# Patient Record
Sex: FEMALE | Race: WHITE | NOT HISPANIC OR LATINO | Employment: OTHER | URBAN - METROPOLITAN AREA
[De-identification: names, ages, dates, MRNs, and addresses within clinical notes are randomized per-mention and may not be internally consistent; named-entity substitution may affect disease eponyms.]

---

## 2017-01-09 ENCOUNTER — OFFICE VISIT - HEALTHEAST (OUTPATIENT)
Dept: INTERNAL MEDICINE | Facility: CLINIC | Age: 77
End: 2017-01-09

## 2017-01-09 DIAGNOSIS — I10 ESSENTIAL HYPERTENSION WITH GOAL BLOOD PRESSURE LESS THAN 140/90: ICD-10-CM

## 2017-01-09 ASSESSMENT — MIFFLIN-ST. JEOR: SCORE: 1288.23

## 2017-03-06 ENCOUNTER — OFFICE VISIT - HEALTHEAST (OUTPATIENT)
Dept: INTERNAL MEDICINE | Facility: CLINIC | Age: 77
End: 2017-03-06

## 2017-03-06 DIAGNOSIS — I10 ESSENTIAL HYPERTENSION WITH GOAL BLOOD PRESSURE LESS THAN 140/90: ICD-10-CM

## 2017-03-06 ASSESSMENT — MIFFLIN-ST. JEOR: SCORE: 1297.3

## 2017-04-03 ENCOUNTER — COMMUNICATION - HEALTHEAST (OUTPATIENT)
Dept: INTERNAL MEDICINE | Facility: CLINIC | Age: 77
End: 2017-04-03

## 2017-04-13 ENCOUNTER — OFFICE VISIT - HEALTHEAST (OUTPATIENT)
Dept: INTERNAL MEDICINE | Facility: CLINIC | Age: 77
End: 2017-04-13

## 2017-04-13 DIAGNOSIS — I10 ESSENTIAL HYPERTENSION WITH GOAL BLOOD PRESSURE LESS THAN 140/90: ICD-10-CM

## 2017-04-13 ASSESSMENT — MIFFLIN-ST. JEOR: SCORE: 1292.76

## 2017-04-17 ENCOUNTER — RECORDS - HEALTHEAST (OUTPATIENT)
Dept: ADMINISTRATIVE | Facility: OTHER | Age: 77
End: 2017-04-17

## 2017-04-23 ENCOUNTER — COMMUNICATION - HEALTHEAST (OUTPATIENT)
Dept: INTERNAL MEDICINE | Facility: CLINIC | Age: 77
End: 2017-04-23

## 2017-04-23 DIAGNOSIS — I10 HTN (HYPERTENSION): ICD-10-CM

## 2017-05-13 ENCOUNTER — COMMUNICATION - HEALTHEAST (OUTPATIENT)
Dept: INTERNAL MEDICINE | Facility: CLINIC | Age: 77
End: 2017-05-13

## 2017-06-06 ENCOUNTER — HOSPITAL ENCOUNTER (OUTPATIENT)
Dept: MAMMOGRAPHY | Facility: HOSPITAL | Age: 77
Discharge: HOME | End: 2017-06-06
Attending: INTERNAL MEDICINE

## 2017-06-06 DIAGNOSIS — Z12.31 VISIT FOR SCREENING MAMMOGRAM: ICD-10-CM

## 2017-06-29 ENCOUNTER — OFFICE VISIT - HEALTHEAST (OUTPATIENT)
Dept: INTERNAL MEDICINE | Facility: CLINIC | Age: 77
End: 2017-06-29

## 2017-06-29 DIAGNOSIS — I10 ESSENTIAL HYPERTENSION WITH GOAL BLOOD PRESSURE LESS THAN 140/90: ICD-10-CM

## 2017-06-29 ASSESSMENT — MIFFLIN-ST. JEOR: SCORE: 1288.23

## 2017-07-21 ENCOUNTER — COMMUNICATION - HEALTHEAST (OUTPATIENT)
Dept: INTERNAL MEDICINE | Facility: CLINIC | Age: 77
End: 2017-07-21

## 2017-07-21 DIAGNOSIS — I10 HTN (HYPERTENSION): ICD-10-CM

## 2017-08-10 ENCOUNTER — OFFICE VISIT - HEALTHEAST (OUTPATIENT)
Dept: INTERNAL MEDICINE | Facility: CLINIC | Age: 77
End: 2017-08-10

## 2017-08-10 DIAGNOSIS — I10 ESSENTIAL HYPERTENSION WITH GOAL BLOOD PRESSURE LESS THAN 140/90: ICD-10-CM

## 2017-08-10 ASSESSMENT — MIFFLIN-ST. JEOR: SCORE: 1292.76

## 2017-08-25 ENCOUNTER — COMMUNICATION - HEALTHEAST (OUTPATIENT)
Dept: INTERNAL MEDICINE | Facility: CLINIC | Age: 77
End: 2017-08-25

## 2017-10-19 ENCOUNTER — COMMUNICATION - HEALTHEAST (OUTPATIENT)
Dept: INTERNAL MEDICINE | Facility: CLINIC | Age: 77
End: 2017-10-19

## 2017-10-19 DIAGNOSIS — I10 HTN (HYPERTENSION): ICD-10-CM

## 2017-10-24 ENCOUNTER — OFFICE VISIT - HEALTHEAST (OUTPATIENT)
Dept: INTERNAL MEDICINE | Facility: CLINIC | Age: 77
End: 2017-10-24

## 2017-10-24 ENCOUNTER — COMMUNICATION - HEALTHEAST (OUTPATIENT)
Dept: INTERNAL MEDICINE | Facility: CLINIC | Age: 77
End: 2017-10-24

## 2017-10-24 DIAGNOSIS — R73.9 HYPERGLYCEMIA: ICD-10-CM

## 2017-10-24 DIAGNOSIS — I10 ESSENTIAL HYPERTENSION: ICD-10-CM

## 2017-10-24 LAB
CHOLEST SERPL-MCNC: 188 MG/DL
FASTING STATUS PATIENT QL REPORTED: YES
HBA1C MFR BLD: 6 % (ref 3.5–6)
HDLC SERPL-MCNC: 54 MG/DL
LDLC SERPL CALC-MCNC: 94 MG/DL
TRIGL SERPL-MCNC: 198 MG/DL

## 2017-10-24 ASSESSMENT — MIFFLIN-ST. JEOR: SCORE: 1306.37

## 2017-11-18 ENCOUNTER — COMMUNICATION - HEALTHEAST (OUTPATIENT)
Dept: INTERNAL MEDICINE | Facility: CLINIC | Age: 77
End: 2017-11-18

## 2018-01-04 ENCOUNTER — OFFICE VISIT - HEALTHEAST (OUTPATIENT)
Dept: INTERNAL MEDICINE | Facility: CLINIC | Age: 78
End: 2018-01-04

## 2018-01-04 DIAGNOSIS — I10 ESSENTIAL HYPERTENSION: ICD-10-CM

## 2018-01-04 ASSESSMENT — MIFFLIN-ST. JEOR: SCORE: 1301.84

## 2018-01-21 ENCOUNTER — COMMUNICATION - HEALTHEAST (OUTPATIENT)
Dept: INTERNAL MEDICINE | Facility: CLINIC | Age: 78
End: 2018-01-21

## 2018-01-21 DIAGNOSIS — I10 HTN (HYPERTENSION): ICD-10-CM

## 2018-02-10 ENCOUNTER — COMMUNICATION - HEALTHEAST (OUTPATIENT)
Dept: INTERNAL MEDICINE | Facility: CLINIC | Age: 78
End: 2018-02-10

## 2018-03-29 ENCOUNTER — OFFICE VISIT - HEALTHEAST (OUTPATIENT)
Dept: INTERNAL MEDICINE | Facility: CLINIC | Age: 78
End: 2018-03-29

## 2018-03-29 DIAGNOSIS — I10 ESSENTIAL HYPERTENSION: ICD-10-CM

## 2018-03-29 ASSESSMENT — MIFFLIN-ST. JEOR: SCORE: 1297.3

## 2018-04-20 ENCOUNTER — COMMUNICATION - HEALTHEAST (OUTPATIENT)
Dept: INTERNAL MEDICINE | Facility: CLINIC | Age: 78
End: 2018-04-20

## 2018-04-20 DIAGNOSIS — I10 HTN (HYPERTENSION): ICD-10-CM

## 2018-04-29 ENCOUNTER — RECORDS - HEALTHEAST (OUTPATIENT)
Dept: ADMINISTRATIVE | Facility: OTHER | Age: 78
End: 2018-04-29

## 2018-05-25 ENCOUNTER — COMMUNICATION - HEALTHEAST (OUTPATIENT)
Dept: INTERNAL MEDICINE | Facility: CLINIC | Age: 78
End: 2018-05-25

## 2018-06-04 ENCOUNTER — COMMUNICATION - HEALTHEAST (OUTPATIENT)
Dept: INTERNAL MEDICINE | Facility: CLINIC | Age: 78
End: 2018-06-04

## 2018-06-04 ENCOUNTER — OFFICE VISIT - HEALTHEAST (OUTPATIENT)
Dept: INTERNAL MEDICINE | Facility: CLINIC | Age: 78
End: 2018-06-04

## 2018-06-04 DIAGNOSIS — E11.9 DIABETES MELLITUS, TYPE 2 (H): ICD-10-CM

## 2018-06-04 LAB
CHOLEST SERPL-MCNC: 196 MG/DL
FASTING STATUS PATIENT QL REPORTED: ABNORMAL
FASTING STATUS PATIENT QL REPORTED: ABNORMAL
GLUCOSE BLD-MCNC: 128 MG/DL (ref 70–125)
HBA1C MFR BLD: 5.9 % (ref 3.5–6)
HDLC SERPL-MCNC: 58 MG/DL
LDLC SERPL CALC-MCNC: 104 MG/DL
TRIGL SERPL-MCNC: 170 MG/DL

## 2018-06-04 ASSESSMENT — MIFFLIN-ST. JEOR: SCORE: 1283.69

## 2018-06-21 ENCOUNTER — HOSPITAL ENCOUNTER (OUTPATIENT)
Dept: MAMMOGRAPHY | Facility: CLINIC | Age: 78
Discharge: HOME | End: 2018-06-21
Attending: INTERNAL MEDICINE

## 2018-06-21 DIAGNOSIS — Z12.31 VISIT FOR SCREENING MAMMOGRAM: ICD-10-CM

## 2018-07-21 ENCOUNTER — COMMUNICATION - HEALTHEAST (OUTPATIENT)
Dept: INTERNAL MEDICINE | Facility: CLINIC | Age: 78
End: 2018-07-21

## 2018-07-21 DIAGNOSIS — I10 HTN (HYPERTENSION): ICD-10-CM

## 2018-08-12 ENCOUNTER — COMMUNICATION - HEALTHEAST (OUTPATIENT)
Dept: INTERNAL MEDICINE | Facility: CLINIC | Age: 78
End: 2018-08-12

## 2018-09-10 ENCOUNTER — OFFICE VISIT - HEALTHEAST (OUTPATIENT)
Dept: INTERNAL MEDICINE | Facility: CLINIC | Age: 78
End: 2018-09-10

## 2018-09-10 ENCOUNTER — COMMUNICATION - HEALTHEAST (OUTPATIENT)
Dept: INTERNAL MEDICINE | Facility: CLINIC | Age: 78
End: 2018-09-10

## 2018-09-10 DIAGNOSIS — I10 ESSENTIAL HYPERTENSION: ICD-10-CM

## 2018-09-10 ASSESSMENT — MIFFLIN-ST. JEOR: SCORE: 1288.23

## 2018-11-08 ENCOUNTER — COMMUNICATION - HEALTHEAST (OUTPATIENT)
Dept: INTERNAL MEDICINE | Facility: CLINIC | Age: 78
End: 2018-11-08

## 2018-11-13 ENCOUNTER — OFFICE VISIT - HEALTHEAST (OUTPATIENT)
Dept: INTERNAL MEDICINE | Facility: CLINIC | Age: 78
End: 2018-11-13

## 2018-11-13 DIAGNOSIS — I10 ESSENTIAL HYPERTENSION: ICD-10-CM

## 2018-11-13 ASSESSMENT — MIFFLIN-ST. JEOR: SCORE: 1279.16

## 2018-11-21 ENCOUNTER — COMMUNICATION - HEALTHEAST (OUTPATIENT)
Dept: INTERNAL MEDICINE | Facility: CLINIC | Age: 78
End: 2018-11-21

## 2019-02-07 ENCOUNTER — COMMUNICATION - HEALTHEAST (OUTPATIENT)
Dept: INTERNAL MEDICINE | Facility: CLINIC | Age: 79
End: 2019-02-07

## 2019-03-14 ENCOUNTER — OFFICE VISIT - HEALTHEAST (OUTPATIENT)
Dept: INTERNAL MEDICINE | Facility: CLINIC | Age: 79
End: 2019-03-14

## 2019-03-14 DIAGNOSIS — I10 ESSENTIAL HYPERTENSION: ICD-10-CM

## 2019-03-14 ASSESSMENT — MIFFLIN-ST. JEOR: SCORE: 1292.76

## 2019-03-18 ENCOUNTER — RECORDS - HEALTHEAST (OUTPATIENT)
Dept: ADMINISTRATIVE | Facility: OTHER | Age: 79
End: 2019-03-18

## 2019-03-26 ENCOUNTER — RECORDS - HEALTHEAST (OUTPATIENT)
Dept: HEALTH INFORMATION MANAGEMENT | Facility: CLINIC | Age: 79
End: 2019-03-26

## 2019-05-10 ENCOUNTER — COMMUNICATION - HEALTHEAST (OUTPATIENT)
Dept: INTERNAL MEDICINE | Facility: CLINIC | Age: 79
End: 2019-05-10

## 2019-05-10 DIAGNOSIS — Z00.00 ROUTINE GENERAL MEDICAL EXAMINATION AT A HEALTH CARE FACILITY: ICD-10-CM

## 2019-07-08 ENCOUNTER — COMMUNICATION - HEALTHEAST (OUTPATIENT)
Dept: INTERNAL MEDICINE | Facility: CLINIC | Age: 79
End: 2019-07-08

## 2019-07-08 ENCOUNTER — OFFICE VISIT - HEALTHEAST (OUTPATIENT)
Dept: INTERNAL MEDICINE | Facility: CLINIC | Age: 79
End: 2019-07-08

## 2019-07-08 DIAGNOSIS — E11.8 TYPE 2 DIABETES MELLITUS WITH COMPLICATION, WITHOUT LONG-TERM CURRENT USE OF INSULIN (H): ICD-10-CM

## 2019-07-08 DIAGNOSIS — E66.01 MORBID OBESITY (H): ICD-10-CM

## 2019-07-08 LAB
CHOLEST SERPL-MCNC: 214 MG/DL
FASTING STATUS PATIENT QL REPORTED: YES
FASTING STATUS PATIENT QL REPORTED: YES
GLUCOSE BLD-MCNC: 123 MG/DL (ref 70–125)
HBA1C MFR BLD: 6.2 % (ref 3.5–6)
HDLC SERPL-MCNC: 52 MG/DL
HGB BLD-MCNC: 15.3 G/DL (ref 12–16)
LDLC SERPL CALC-MCNC: 104 MG/DL
POTASSIUM BLD-SCNC: 4.9 MMOL/L (ref 3.5–5)
TRIGL SERPL-MCNC: 290 MG/DL

## 2019-07-08 ASSESSMENT — MIFFLIN-ST. JEOR: SCORE: 1279.16

## 2019-07-20 ENCOUNTER — COMMUNICATION - HEALTHEAST (OUTPATIENT)
Dept: INTERNAL MEDICINE | Facility: CLINIC | Age: 79
End: 2019-07-20

## 2019-07-20 DIAGNOSIS — I10 HTN (HYPERTENSION): ICD-10-CM

## 2019-08-04 ENCOUNTER — COMMUNICATION - HEALTHEAST (OUTPATIENT)
Dept: INTERNAL MEDICINE | Facility: CLINIC | Age: 79
End: 2019-08-04

## 2019-08-04 DIAGNOSIS — Z00.00 ROUTINE GENERAL MEDICAL EXAMINATION AT A HEALTH CARE FACILITY: ICD-10-CM

## 2019-08-18 ENCOUNTER — COMMUNICATION - HEALTHEAST (OUTPATIENT)
Dept: INTERNAL MEDICINE | Facility: CLINIC | Age: 79
End: 2019-08-18

## 2019-08-18 DIAGNOSIS — Z00.00 ROUTINE GENERAL MEDICAL EXAMINATION AT A HEALTH CARE FACILITY: ICD-10-CM

## 2019-09-14 ENCOUNTER — COMMUNICATION - HEALTHEAST (OUTPATIENT)
Dept: INTERNAL MEDICINE | Facility: CLINIC | Age: 79
End: 2019-09-14

## 2019-09-14 DIAGNOSIS — Z00.00 ROUTINE GENERAL MEDICAL EXAMINATION AT A HEALTH CARE FACILITY: ICD-10-CM

## 2019-10-04 ENCOUNTER — AMBULATORY - HEALTHEAST (OUTPATIENT)
Dept: INTERNAL MEDICINE | Facility: CLINIC | Age: 79
End: 2019-10-04

## 2019-10-21 ENCOUNTER — OFFICE VISIT - HEALTHEAST (OUTPATIENT)
Dept: INTERNAL MEDICINE | Facility: CLINIC | Age: 79
End: 2019-10-21

## 2019-10-21 DIAGNOSIS — I10 ESSENTIAL HYPERTENSION: ICD-10-CM

## 2019-10-21 ASSESSMENT — MIFFLIN-ST. JEOR: SCORE: 1283.69

## 2019-11-03 ENCOUNTER — COMMUNICATION - HEALTHEAST (OUTPATIENT)
Dept: INTERNAL MEDICINE | Facility: CLINIC | Age: 79
End: 2019-11-03

## 2019-11-03 DIAGNOSIS — I10 ESSENTIAL HYPERTENSION: ICD-10-CM

## 2020-01-21 ENCOUNTER — OFFICE VISIT - HEALTHEAST (OUTPATIENT)
Dept: INTERNAL MEDICINE | Facility: CLINIC | Age: 80
End: 2020-01-21

## 2020-01-21 DIAGNOSIS — E11.69 TYPE 2 DIABETES MELLITUS WITH OTHER SPECIFIED COMPLICATION, WITHOUT LONG-TERM CURRENT USE OF INSULIN (H): ICD-10-CM

## 2020-01-21 LAB
CHOLEST SERPL-MCNC: 213 MG/DL
FASTING STATUS PATIENT QL REPORTED: YES
FASTING STATUS PATIENT QL REPORTED: YES
GLUCOSE BLD-MCNC: 117 MG/DL (ref 70–125)
HBA1C MFR BLD: 5.9 % (ref 3.5–6)
HDLC SERPL-MCNC: 61 MG/DL
LDLC SERPL CALC-MCNC: 115 MG/DL
POTASSIUM BLD-SCNC: 4.3 MMOL/L (ref 3.5–5)
TRIGL SERPL-MCNC: 185 MG/DL

## 2020-01-21 ASSESSMENT — MIFFLIN-ST. JEOR: SCORE: 1265.55

## 2020-01-22 ENCOUNTER — COMMUNICATION - HEALTHEAST (OUTPATIENT)
Dept: INTERNAL MEDICINE | Facility: CLINIC | Age: 80
End: 2020-01-22

## 2020-02-01 ENCOUNTER — COMMUNICATION - HEALTHEAST (OUTPATIENT)
Dept: INTERNAL MEDICINE | Facility: CLINIC | Age: 80
End: 2020-02-01

## 2020-02-01 DIAGNOSIS — I10 ESSENTIAL HYPERTENSION: ICD-10-CM

## 2020-05-11 ENCOUNTER — COMMUNICATION - HEALTHEAST (OUTPATIENT)
Dept: INTERNAL MEDICINE | Facility: CLINIC | Age: 80
End: 2020-05-11

## 2020-05-11 DIAGNOSIS — I10 ESSENTIAL HYPERTENSION: ICD-10-CM

## 2020-07-14 ENCOUNTER — OFFICE VISIT - HEALTHEAST (OUTPATIENT)
Dept: INTERNAL MEDICINE | Facility: CLINIC | Age: 80
End: 2020-07-14

## 2020-07-14 DIAGNOSIS — T78.2XXA ANAPHYLACTIC REACTION: ICD-10-CM

## 2020-07-14 ASSESSMENT — MIFFLIN-ST. JEOR: SCORE: 1297.3

## 2020-07-18 ENCOUNTER — COMMUNICATION - HEALTHEAST (OUTPATIENT)
Dept: INTERNAL MEDICINE | Facility: CLINIC | Age: 80
End: 2020-07-18

## 2020-07-18 DIAGNOSIS — I10 HTN (HYPERTENSION): ICD-10-CM

## 2020-07-19 RX ORDER — AMLODIPINE BESYLATE 10 MG/1
10 TABLET ORAL DAILY
Qty: 90 TABLET | Refills: 3 | Status: SHIPPED | OUTPATIENT
Start: 2020-07-19 | End: 2022-01-04

## 2020-08-02 ENCOUNTER — COMMUNICATION - HEALTHEAST (OUTPATIENT)
Dept: INTERNAL MEDICINE | Facility: CLINIC | Age: 80
End: 2020-08-02

## 2020-08-02 DIAGNOSIS — Z00.00 ROUTINE GENERAL MEDICAL EXAMINATION AT A HEALTH CARE FACILITY: ICD-10-CM

## 2020-08-03 RX ORDER — METOPROLOL SUCCINATE 25 MG/1
TABLET, EXTENDED RELEASE ORAL
Qty: 180 TABLET | Refills: 3 | Status: SHIPPED | OUTPATIENT
Start: 2020-08-03 | End: 2021-08-08

## 2020-08-09 ENCOUNTER — COMMUNICATION - HEALTHEAST (OUTPATIENT)
Dept: INTERNAL MEDICINE | Facility: CLINIC | Age: 80
End: 2020-08-09

## 2020-08-09 DIAGNOSIS — I10 ESSENTIAL HYPERTENSION: ICD-10-CM

## 2020-10-12 ENCOUNTER — COMMUNICATION - HEALTHEAST (OUTPATIENT)
Dept: INTERNAL MEDICINE | Facility: CLINIC | Age: 80
End: 2020-10-12

## 2020-10-12 DIAGNOSIS — I10 ESSENTIAL HYPERTENSION: ICD-10-CM

## 2020-10-12 RX ORDER — LOSARTAN POTASSIUM 50 MG/1
50 TABLET ORAL DAILY
Qty: 90 TABLET | Refills: 3 | Status: SHIPPED | OUTPATIENT
Start: 2020-10-12 | End: 2021-10-03

## 2020-11-17 ENCOUNTER — OFFICE VISIT - HEALTHEAST (OUTPATIENT)
Dept: INTERNAL MEDICINE | Facility: CLINIC | Age: 80
End: 2020-11-17

## 2020-11-17 DIAGNOSIS — E11.69 TYPE 2 DIABETES MELLITUS WITH OTHER SPECIFIED COMPLICATION, WITHOUT LONG-TERM CURRENT USE OF INSULIN (H): ICD-10-CM

## 2020-11-17 DIAGNOSIS — I10 ESSENTIAL HYPERTENSION: ICD-10-CM

## 2020-11-17 LAB
ALBUMIN SERPL-MCNC: 4.3 G/DL (ref 3.5–5)
ALP SERPL-CCNC: 75 U/L (ref 45–120)
ALT SERPL W P-5'-P-CCNC: 22 U/L (ref 0–45)
ANION GAP SERPL CALCULATED.3IONS-SCNC: 13 MMOL/L (ref 5–18)
AST SERPL W P-5'-P-CCNC: 16 U/L (ref 0–40)
BILIRUB SERPL-MCNC: 1.6 MG/DL (ref 0–1)
BUN SERPL-MCNC: 15 MG/DL (ref 8–28)
CALCIUM SERPL-MCNC: 10.1 MG/DL (ref 8.5–10.5)
CHLORIDE BLD-SCNC: 102 MMOL/L (ref 98–107)
CHOLEST SERPL-MCNC: 212 MG/DL
CO2 SERPL-SCNC: 26 MMOL/L (ref 22–31)
CREAT SERPL-MCNC: 0.87 MG/DL (ref 0.6–1.1)
FASTING STATUS PATIENT QL REPORTED: ABNORMAL
GFR SERPL CREATININE-BSD FRML MDRD: >60 ML/MIN/1.73M2
GLUCOSE BLD-MCNC: 135 MG/DL (ref 70–125)
HBA1C MFR BLD: 6 %
HDLC SERPL-MCNC: 57 MG/DL
LDLC SERPL CALC-MCNC: 110 MG/DL
POTASSIUM BLD-SCNC: 3.9 MMOL/L (ref 3.5–5)
PROT SERPL-MCNC: 7 G/DL (ref 6–8)
SODIUM SERPL-SCNC: 141 MMOL/L (ref 136–145)
TRIGL SERPL-MCNC: 224 MG/DL

## 2020-11-17 ASSESSMENT — MIFFLIN-ST. JEOR: SCORE: 1306.37

## 2020-11-19 ENCOUNTER — COMMUNICATION - HEALTHEAST (OUTPATIENT)
Dept: INTERNAL MEDICINE | Facility: CLINIC | Age: 80
End: 2020-11-19

## 2021-02-02 ENCOUNTER — COMMUNICATION - HEALTHEAST (OUTPATIENT)
Dept: ADMINISTRATIVE | Facility: CLINIC | Age: 81
End: 2021-02-02

## 2021-03-18 ENCOUNTER — OFFICE VISIT - HEALTHEAST (OUTPATIENT)
Dept: INTERNAL MEDICINE | Facility: CLINIC | Age: 81
End: 2021-03-18

## 2021-03-18 DIAGNOSIS — E11.69 TYPE 2 DIABETES MELLITUS WITH OTHER SPECIFIED COMPLICATION, WITHOUT LONG-TERM CURRENT USE OF INSULIN (H): ICD-10-CM

## 2021-03-18 ASSESSMENT — MIFFLIN-ST. JEOR: SCORE: 1306.37

## 2021-05-01 ENCOUNTER — COMMUNICATION - HEALTHEAST (OUTPATIENT)
Dept: INTERNAL MEDICINE | Facility: CLINIC | Age: 81
End: 2021-05-01

## 2021-05-01 DIAGNOSIS — I10 ESSENTIAL HYPERTENSION: ICD-10-CM

## 2021-05-01 RX ORDER — HYDROCHLOROTHIAZIDE 25 MG/1
25 TABLET ORAL DAILY
Qty: 90 TABLET | Refills: 2 | Status: SHIPPED | OUTPATIENT
Start: 2021-05-01 | End: 2022-01-04

## 2021-05-26 ENCOUNTER — RECORDS - HEALTHEAST (OUTPATIENT)
Dept: ADMINISTRATIVE | Facility: CLINIC | Age: 81
End: 2021-05-26

## 2021-05-27 ENCOUNTER — RECORDS - HEALTHEAST (OUTPATIENT)
Dept: ADMINISTRATIVE | Facility: CLINIC | Age: 81
End: 2021-05-27

## 2021-05-28 ENCOUNTER — RECORDS - HEALTHEAST (OUTPATIENT)
Dept: ADMINISTRATIVE | Facility: CLINIC | Age: 81
End: 2021-05-28

## 2021-05-30 VITALS — WEIGHT: 196 LBS | HEIGHT: 62 IN | BODY MASS INDEX: 36.07 KG/M2

## 2021-05-30 VITALS — HEIGHT: 62 IN | BODY MASS INDEX: 35.88 KG/M2 | WEIGHT: 195 LBS

## 2021-05-30 VITALS — HEIGHT: 62 IN | WEIGHT: 194 LBS | BODY MASS INDEX: 35.7 KG/M2

## 2021-05-30 NOTE — PROGRESS NOTES
Office Visit - Follow up    Tia Barron   79 y.o. female    Date of Visit: 7/8/2019    Chief Complaint   Patient presents with     Follow-up     BP check - Fasting for lab work       Subjective: Diabetes mellitus type 2 with hypertension and obesity.  Weight down 3 pounds from previous.    Macular degeneration left eye decreased central vision.  A Avastin injections left eye with retinal specialist presiding.    Denies chest pain or shortness of breath no blood in stool or urine.    Medication list reconciled well-tolerated no ill effects.  Diabetic foot examinations have been done patient has hyperglycemia and resultant diabetes.  Weight is down 3 pounds and insulin resistance.  No new drug allergies.  Medication list reconciled and well-tolerated no        ROS: A comprehensive review of systems was performed and was otherwise negative    Medications:  Prior to Admission medications    Medication Sig Start Date End Date Taking? Authorizing Provider   amLODIPine (NORVASC) 10 MG tablet Take 1 tablet (10 mg total) by mouth daily. 7/21/18  Yes Delmer Back MD   aspirin 81 MG EC tablet Take 81 mg by mouth daily.   Yes PROVIDER, HISTORICAL   calcium carbonate (CALCIUM 600) 600 mg calcium (1,500 mg) tablet Take 600 mg by mouth 2 (two) times a day with meals.   Yes PROVIDER, HISTORICAL   hydroCHLOROthiazide (HYDRODIURIL) 25 MG tablet TAKE 1 TABLET(25 MG) BY MOUTH DAILY 2/8/19  Yes Delmer aBck MD   hydroCHLOROthiazide (HYDRODIURIL) 25 MG tablet TAKE 1 TABLET(25 MG) BY MOUTH DAILY 5/10/19  Yes Delmer Back MD   lisinopril (PRINIVIL,ZESTRIL) 5 MG tablet TAKE 1 TABLET(5 MG) BY MOUTH TWICE DAILY 9/10/18  Yes Delmer Back MD   metoprolol succinate (TOPROL-XL) 25 MG TAKE 2 TABLETS BY MOUTH DAILY 5/10/19  Yes Delmer Back MD   EPINEPHrine (EPIPEN) 0.3 mg/0.3 mL atIn Inject 0.3 mL (0.3 mg total) into the shoulder, thigh, or buttocks once. Inject intramuscularly as directed. 4/20/16    Delmer Back MD       Allergies:   Allergies   Allergen Reactions     Hornet Venom Anaphylaxis       Immunizations:   Immunization History   Administered Date(s) Administered     DT (pediatric) 12/29/2004     Influenza high dose, seasonal 09/06/2015, 09/11/2016, 09/19/2017, 10/01/2018     Influenza, inj, historic,unspecified 08/19/2012, 09/07/2015     Influenza, seasonal,quad inj 6-35 mos 08/25/2011     Pneumo Conj 13-V (2010&after) 07/28/2016     Pneumo Polysac 23-V 12/29/2004     Td,adult,historic,unspecified 12/29/2004     Tdap 06/03/2013     ZOSTER, LIVE 02/27/2008       Exam Chest clear to auscultation and percussion.  Heart tones regular rhythm without murmur rub or gallop.  Abdomen soft nontender no organomegaly.  No peritoneal signs.  Extremities free of edema cyanosis or clubbing.  Neck veins nondistended no thyromegaly or scleral icterus noted, carotids full.  Skin warm and dry easily conversant good spirited.  Normal intelligence.  Neurologically intact no gross localizing findings.  Some weakness noted right lower extremity with resultant edema from prior lumbar back surgery and lumbar radiculopathy.    Assessment and Plan  Diabetes mellitus type 2 check hemoglobin A1c blood sugar plus potassium level and lipid panel today.    Hypertension controlled.  138/62 pulse 86 regular.    Decreased vision left eye macular degeneration status post a Avastin injections.    Weakness right lower extremity after lumbar back surgery.  With lumbar radiculopathy atrophy.    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    Time: total time spent with the patient was 25 minutes of which >50% was spent in counseling and coordination of care    The following high BMI interventions were performed this visit: encouragement to exercise    Delmer Back MD    Patient Active Problem List   Diagnosis     Hyperglycemia     Essential Hypertension     Lumbago      Sciatica     Obesity (BMI 35.0-39.9) with comorbidity (H)     Type 2 diabetes mellitus with complication, without long-term current use of insulin (H)

## 2021-05-30 NOTE — TELEPHONE ENCOUNTER
Refill Approved    Rx renewed per Medication Renewal Policy. Medication was last renewed on 7/21/2018 with 3 refills.  Last office visit: 7/8/2019 with PCP Dr KEYANA Fam, [facility] Triage/Med Refill 7/20/2019     Requested Prescriptions   Pending Prescriptions Disp Refills     amLODIPine (NORVASC) 10 MG tablet [Pharmacy Med Name: AMLODIPINE BESYLATE 10MG TABLETS] 90 tablet 0     Sig: TAKE 1 TABLET(10 MG) BY MOUTH DAILY       Calcium-Channel Blockers Protocol Passed - 7/20/2019  9:45 AM        Passed - PCP or prescribing provider visit in past 12 months or next 3 months     Last office visit with prescriber/PCP: 7/8/2019 Delmer Back MD OR same dept: 7/8/2019 Delmer Back MD OR same specialty: 7/8/2019 Delmer Back MD  Last physical: 7/28/2016 Last MTM visit: Visit date not found   Next visit within 3 mo: Visit date not found  Next physical within 3 mo: Visit date not found  Prescriber OR PCP: Delmer Back MD  Last diagnosis associated with med order: 1. HTN (hypertension)  - amLODIPine (NORVASC) 10 MG tablet [Pharmacy Med Name: AMLODIPINE BESYLATE 10MG TABLETS]; TAKE 1 TABLET(10 MG) BY MOUTH DAILY  Dispense: 90 tablet; Refill: 0    If protocol passes may refill for 12 months if within 3 months of last provider visit (or a total of 15 months).             Passed - Blood pressure filed in past 12 months     BP Readings from Last 1 Encounters:   07/08/19 138/62

## 2021-05-31 ENCOUNTER — RECORDS - HEALTHEAST (OUTPATIENT)
Dept: ADMINISTRATIVE | Facility: CLINIC | Age: 81
End: 2021-05-31

## 2021-05-31 VITALS — HEIGHT: 62 IN | WEIGHT: 197 LBS | BODY MASS INDEX: 36.25 KG/M2

## 2021-05-31 VITALS — BODY MASS INDEX: 35.88 KG/M2 | WEIGHT: 195 LBS | HEIGHT: 62 IN

## 2021-05-31 VITALS — WEIGHT: 194 LBS | HEIGHT: 62 IN | BODY MASS INDEX: 35.7 KG/M2

## 2021-05-31 VITALS — HEIGHT: 62 IN | WEIGHT: 198 LBS | BODY MASS INDEX: 36.44 KG/M2

## 2021-05-31 NOTE — TELEPHONE ENCOUNTER
Refill Approved    Rx renewed per Medication Renewal Policy. Medication was last renewed on 5/10/19.    Merari Chavez, [facility] Triage/Med Refill 8/4/2019     Requested Prescriptions   Pending Prescriptions Disp Refills     metoprolol succinate (TOPROL-XL) 25 MG [Pharmacy Med Name: METOPROLOL ER SUCCINATE 25MG TABS] 180 tablet 0     Sig: TAKE 2 TABLETS BY MOUTH DAILY       Beta-Blockers Refill Protocol Passed - 8/4/2019 10:05 AM        Passed - PCP or prescribing provider visit in past 12 months or next 3 months     Last office visit with prescriber/PCP: 7/8/2019 Delmer Back MD OR same dept: 7/8/2019 Delmer Back MD OR same specialty: 7/8/2019 Delmer Back MD  Last physical: 7/28/2016 Last MTM visit: Visit date not found   Next visit within 3 mo: Visit date not found  Next physical within 3 mo: Visit date not found  Prescriber OR PCP: Delmer Back MD  Last diagnosis associated with med order: 1. Routine general medical examination at a health care facility  - metoprolol succinate (TOPROL-XL) 25 MG [Pharmacy Med Name: METOPROLOL ER SUCCINATE 25MG TABS]; TAKE 2 TABLETS BY MOUTH DAILY  Dispense: 180 tablet; Refill: 0    If protocol passes may refill for 12 months if within 3 months of last provider visit (or a total of 15 months).             Passed - Blood pressure filed in past 12 months     BP Readings from Last 1 Encounters:   07/08/19 138/62

## 2021-05-31 NOTE — TELEPHONE ENCOUNTER
RN cannot approve Refill Request    RN can NOT refill this medication PCP messaged that patient is overdue for Labs. Last office visit: 7/8/2019 Delmer Back MD Last Physical: 7/28/2016 Last MTM visit: Visit date not found Last visit same specialty: 7/8/2019 Delmer Back MD.  Next visit within 3 mo: Visit date not found  Next physical within 3 mo: Visit date not found      Eitan Shipley, [facility] Triage/Med Refill 8/18/2019    Requested Prescriptions   Pending Prescriptions Disp Refills     hydroCHLOROthiazide (HYDRODIURIL) 25 MG tablet [Pharmacy Med Name: HYDROCHLOROTHIAZIDE 25MG TABLETS] 90 tablet 0     Sig: TAKE 1 TABLET(25 MG) BY MOUTH DAILY       Diuretics/Combination Diuretics Refill Protocol  Failed - 8/18/2019 10:58 AM        Failed - Serum Sodium in past 12 months      No results found for: LN-SODIUM          Failed - Serum Creatinine in past 12 months      Creatinine   Date Value Ref Range Status   07/28/2016 0.81 0.60 - 1.10 mg/dL Final     Comment:       New Creatinine method with new reference ranges as of 9/14/15             Passed - Visit with PCP or prescribing provider visit in past 12 months     Last office visit with prescriber/PCP: 7/8/2019 Delmer Back MD OR same dept: 7/8/2019 Delmer Back MD OR same specialty: 7/8/2019 Delmer Back MD  Last physical: 7/28/2016 Last MTM visit: Visit date not found   Next visit within 3 mo: Visit date not found  Next physical within 3 mo: Visit date not found  Prescriber OR PCP: Delmer Back MD  Last diagnosis associated with med order: There are no diagnoses linked to this encounter.  If protocol passes may refill for 12 months if within 3 months of last provider visit (or a total of 15 months).             Passed - Serum Potassium in past 12 months      Lab Results   Component Value Date    Potassium 4.9 07/08/2019             Passed - Blood pressure on file in past 12 months     BP Readings from Last 1  Encounters:   07/08/19 138/62

## 2021-06-01 VITALS — WEIGHT: 193 LBS | BODY MASS INDEX: 35.51 KG/M2 | HEIGHT: 62 IN

## 2021-06-01 VITALS — HEIGHT: 62 IN | WEIGHT: 196 LBS | BODY MASS INDEX: 36.07 KG/M2

## 2021-06-01 NOTE — TELEPHONE ENCOUNTER
RN cannot approve Refill Request    RN can NOT refill this medication PCP messaged that patient is overdue for Labs and Protocol failed and NO refill given.     Aide Head, [facility] Triage/Med Refill 9/14/2019    Requested Prescriptions   Pending Prescriptions Disp Refills     lisinopril (PRINIVIL,ZESTRIL) 5 MG tablet [Pharmacy Med Name: LISINOPRIL 5MG TABLETS] 180 tablet 0     Sig: TAKE 1 TABLET(5 MG) BY MOUTH TWICE DAILY       Ace Inhibitors Refill Protocol Failed - 9/14/2019  1:14 PM        Failed - Serum Creatinine in past 12 months     Creatinine   Date Value Ref Range Status   07/28/2016 0.81 0.60 - 1.10 mg/dL Final     Comment:       New Creatinine method with new reference ranges as of 9/14/15             Passed - PCP or prescribing provider visit in past 12 months       Last office visit with prescriber/PCP: 7/8/2019 Delmer Back MD OR same dept: 7/8/2019 Delmer Back MD OR same specialty: 7/8/2019 Delmer Back MD  Last physical: 7/28/2016 Last MTM visit: Visit date not found   Next visit within 3 mo: Visit date not found  Next physical within 3 mo: Visit date not found  Prescriber OR PCP: Delmer Back MD  Last diagnosis associated with med order: There are no diagnoses linked to this encounter.  If protocol passes may refill for 12 months if within 3 months of last provider visit (or a total of 15 months).             Passed - Serum Potassium in past 12 months     Lab Results   Component Value Date    Potassium 4.9 07/08/2019             Passed - Blood pressure filed in past 12 months     BP Readings from Last 1 Encounters:   07/08/19 138/62

## 2021-06-02 VITALS — WEIGHT: 195 LBS | HEIGHT: 62 IN | BODY MASS INDEX: 35.88 KG/M2

## 2021-06-02 VITALS — BODY MASS INDEX: 35.33 KG/M2 | WEIGHT: 192 LBS | HEIGHT: 62 IN

## 2021-06-02 VITALS — WEIGHT: 194 LBS | HEIGHT: 62 IN | BODY MASS INDEX: 35.7 KG/M2

## 2021-06-02 NOTE — TELEPHONE ENCOUNTER
Refill Given    Refill given per Policy, patient informed they are overdue for Labs and Office Visit   OV 10/21/19    Merari Chavez, [facility] Triage/Med Refill 11/3/2019    Requested Prescriptions   Pending Prescriptions Disp Refills     hydroCHLOROthiazide (HYDRODIURIL) 25 MG tablet [Pharmacy Med Name: HYDROCHLOROTHIAZIDE 25MG TABLETS] 90 tablet 0     Sig: TAKE 1 TABLET(25 MG) BY MOUTH DAILY       Diuretics/Combination Diuretics Refill Protocol  Failed - 11/3/2019 10:11 AM        Failed - Serum Sodium in past 12 months      No results found for: LN-SODIUM          Failed - Serum Creatinine in past 12 months      Creatinine   Date Value Ref Range Status   07/28/2016 0.81 0.60 - 1.10 mg/dL Final     Comment:       New Creatinine method with new reference ranges as of 9/14/15             Passed - Visit with PCP or prescribing provider visit in past 12 months     Last office visit with prescriber/PCP: 10/21/2019 Delmer Back MD OR same dept: 10/21/2019 Delmer Back MD OR same specialty: 10/21/2019 Delmer Back MD  Last physical: 7/28/2016 Last MTM visit: Visit date not found   Next visit within 3 mo: Visit date not found  Next physical within 3 mo: Visit date not found  Prescriber OR PCP: Delmer Back MD  Last diagnosis associated with med order: There are no diagnoses linked to this encounter.  If protocol passes may refill for 12 months if within 3 months of last provider visit (or a total of 15 months).             Passed - Serum Potassium in past 12 months      Lab Results   Component Value Date    Potassium 4.9 07/08/2019             Passed - Blood pressure on file in past 12 months     BP Readings from Last 1 Encounters:   10/21/19 136/72

## 2021-06-02 NOTE — PROGRESS NOTES
Office Visit - Follow up    Tia Barron   79 y.o. female    Date of Visit: 10/21/2019    Chief Complaint   Patient presents with     Hypertension     Alopecia       Subjective: Hypertension.    Dry cough and loss of hair.  Patient attributes to lisinopril.    The patient has had 2 prior lumbar back operations.  Her right leg is weak discussed.    Lumbar radiculopathy.    No blood in stool or urine no chest pain or shortness of breath medication list reviewed well-tolerated normal effects reconciled in chart.    She is allergic to bee sting venom.    Mammogram allCLEAR June 21, 2018.    Non-smoker no excess alcohol.    ROS: A comprehensive review of systems was performed and was otherwise negative    Medications:  Prior to Admission medications    Medication Sig Start Date End Date Taking? Authorizing Provider   hydroCHLOROthiazide (HYDRODIURIL) 25 MG tablet  2/10/18  Yes PROVIDER, HISTORICAL   hydroCHLOROthiazide (HYDRODIURIL) 25 MG tablet TAKE 1 TABLET(25 MG) BY MOUTH DAILY 8/19/19 10/21/19 Yes Delmer Back MD   amLODIPine (NORVASC) 10 MG tablet Take 1 tablet (10 mg total) by mouth daily. 7/20/19   Delmer Back MD   aspirin 81 MG EC tablet Take 81 mg by mouth daily.    PROVIDER, HISTORICAL   calcium carbonate (CALCIUM 600) 600 mg calcium (1,500 mg) tablet Take 600 mg by mouth 2 (two) times a day with meals.    PROVIDER, HISTORICAL   EPINEPHrine (EPIPEN) 0.3 mg/0.3 mL atIn Inject 0.3 mL (0.3 mg total) into the shoulder, thigh, or buttocks once. Inject intramuscularly as directed. 4/20/16   Delmer Back MD   lisinopril (PRINIVIL,ZESTRIL) 5 MG tablet TAKE 1 TABLET(5 MG) BY MOUTH TWICE DAILY 9/15/19   Delmer Back MD   losartan (COZAAR) 50 MG tablet Take 1 tablet (50 mg total) by mouth daily. 10/21/19   Delmer Back MD   metoprolol succinate (TOPROL-XL) 25 MG Take 2 tablets (50 mg total) by mouth daily. 8/4/19   Delmer Back MD       Allergies:   Allergies   Allergen  Reactions     Hornet Venom Anaphylaxis       Immunizations:   Immunization History   Administered Date(s) Administered     DT (pediatric) 12/29/2004     Influenza high dose,seasonal,PF, 65+ yrs 09/06/2015, 09/11/2016, 09/19/2017, 10/01/2018     Influenza, inj, historic,unspecified 08/19/2012, 09/07/2015     Influenza, seasonal,quad inj 6-35 mos 08/25/2011     Pneumo Conj 13-V (2010&after) 07/28/2016     Pneumo Polysac 23-V 12/29/2004     Td,adult,historic,unspecified 12/29/2004     Tdap 06/03/2013     ZOSTER, LIVE 02/27/2008       Exam Chest clear to auscultation and percussion.  Heart tones regular rhythm without murmur rub or gallop.  Abdomen soft nontender no organomegaly.  No peritoneal signs.  Extremities free of edema cyanosis or clubbing.  Neck veins nondistended no thyromegaly or scleral icterus noted, carotids full.  Skin warm and dry easily conversant good spirited.  Normal intelligence.  Neurologically intact no gross localizing findings.    146/74 recheck 136/72 pulse 90 respirations 18 O2 sats 98% on room air.    Assessment and Plan  Hypertension with obesity BMI 35+.  Bee sting venom allergy.    Cough related to ACE inhibitor therapy with hair loss.  DC lisinopril start losartan 50 mg daily all other meds and cares same.  We had a good discussion.    Lumbar radiculopathy with leg weakness discussed reassured encourage increase activity physical therapy at home.  RTC 3 months time.    Time: total time spent with the patient was 25 minutes of which >50% was spent in counseling and coordination of care    The following high BMI interventions were performed this visit: encouragement to exercise    Delmer Back MD    Patient Active Problem List   Diagnosis     Hyperglycemia     Essential Hypertension     Lumbago     Sciatica     Obesity (BMI 35.0-39.9) with comorbidity (H)     Type 2 diabetes mellitus with complication, without long-term current use of insulin (H)

## 2021-06-03 VITALS — BODY MASS INDEX: 35.33 KG/M2 | WEIGHT: 192 LBS | HEIGHT: 62 IN

## 2021-06-03 VITALS
OXYGEN SATURATION: 98 % | BODY MASS INDEX: 35.51 KG/M2 | HEART RATE: 89 BPM | SYSTOLIC BLOOD PRESSURE: 136 MMHG | DIASTOLIC BLOOD PRESSURE: 72 MMHG | WEIGHT: 193 LBS | HEIGHT: 62 IN

## 2021-06-04 VITALS
HEART RATE: 82 BPM | HEIGHT: 62 IN | OXYGEN SATURATION: 98 % | SYSTOLIC BLOOD PRESSURE: 136 MMHG | BODY MASS INDEX: 34.78 KG/M2 | DIASTOLIC BLOOD PRESSURE: 72 MMHG | WEIGHT: 189 LBS

## 2021-06-04 VITALS
WEIGHT: 196 LBS | HEART RATE: 73 BPM | HEIGHT: 62 IN | BODY MASS INDEX: 36.07 KG/M2 | SYSTOLIC BLOOD PRESSURE: 134 MMHG | DIASTOLIC BLOOD PRESSURE: 70 MMHG | TEMPERATURE: 98.4 F | OXYGEN SATURATION: 98 %

## 2021-06-05 VITALS
DIASTOLIC BLOOD PRESSURE: 80 MMHG | BODY MASS INDEX: 36.44 KG/M2 | HEIGHT: 62 IN | TEMPERATURE: 98.4 F | SYSTOLIC BLOOD PRESSURE: 148 MMHG | OXYGEN SATURATION: 98 % | HEART RATE: 96 BPM | WEIGHT: 198 LBS

## 2021-06-05 VITALS
WEIGHT: 198 LBS | DIASTOLIC BLOOD PRESSURE: 70 MMHG | SYSTOLIC BLOOD PRESSURE: 144 MMHG | OXYGEN SATURATION: 97 % | HEIGHT: 62 IN | HEART RATE: 80 BPM | BODY MASS INDEX: 36.44 KG/M2 | TEMPERATURE: 97.3 F

## 2021-06-05 NOTE — PROGRESS NOTES
Office Visit - Follow up    Tia Barron   79 y.o. female    Date of Visit: 1/21/2020    Chief Complaint   Patient presents with     Hypertension       Subjective: Diabetes mellitus type 2 with hypertension.    Fasting today last labs done July 8, 2019.    Denies polyuria polydipsia no symptoms of peripheral neuropathy.  Denies headache dizziness or lightheadedness is been 5 years since she became a  in April 2015  Dr. Itz Barron passed away he was a noted gynecologist here in the [facility].    Anaphylaxis from the venom.  No blood in stool or urine no chest pain shortness of breath medication list reviewed well-tolerated normal effects.  Mammogram allCLEAR June 21, 2018 last colonoscopy done 2004 allCLEAR no cancer.    ROS: A comprehensive review of systems was performed and was otherwise negative    Medications:  Prior to Admission medications    Medication Sig Start Date End Date Taking? Authorizing Provider   amLODIPine (NORVASC) 10 MG tablet Take 1 tablet (10 mg total) by mouth daily. 7/20/19  Yes Delmer Back MD   aspirin 81 MG EC tablet Take 81 mg by mouth daily.   Yes PROVIDER, HISTORICAL   hydroCHLOROthiazide (HYDRODIURIL) 25 MG tablet Take 1 tablet (25 mg total) by mouth daily. 11/3/19  Yes Delmer Back MD   losartan (COZAAR) 50 MG tablet Take 1 tablet (50 mg total) by mouth daily. 10/21/19  Yes Delmer Back MD   metoprolol succinate (TOPROL-XL) 25 MG Take 2 tablets (50 mg total) by mouth daily. 8/4/19  Yes Delmer Back MD   EPINEPHrine (EPIPEN) 0.3 mg/0.3 mL atIn Inject 0.3 mL (0.3 mg total) into the shoulder, thigh, or buttocks once. Inject intramuscularly as directed. 4/20/16   Delmer Back MD   calcium carbonate (CALCIUM 600) 600 mg calcium (1,500 mg) tablet Take 600 mg by mouth 2 (two) times a day with meals.  1/21/20  PROVIDER, HISTORICAL   hydroCHLOROthiazide (HYDRODIURIL) 25 MG tablet  2/10/18 1/21/20  PROVIDER, HISTORICAL   lisinopril  (PRINIVIL,ZESTRIL) 5 MG tablet TAKE 1 TABLET(5 MG) BY MOUTH TWICE DAILY 9/15/19 1/21/20  Delmer Back MD       Allergies:   Allergies   Allergen Reactions     Hornet Venom Anaphylaxis       Immunizations:   Immunization History   Administered Date(s) Administered     DT (pediatric) 12/29/2004     Influenza high dose,seasonal,PF, 65+ yrs 09/06/2015, 09/11/2016, 09/19/2017, 10/01/2018     Influenza, inj, historic,unspecified 08/19/2012, 09/07/2015     Influenza, seasonal,quad inj 6-35 mos 08/25/2011     Pneumo Conj 13-V (2010&after) 07/28/2016     Pneumo Polysac 23-V 12/29/2004     Td,adult,historic,unspecified 12/29/2004     Tdap 06/03/2013     ZOSTER, LIVE 02/27/2008       Exam Chest clear to auscultation and percussion.  Heart tones regular rhythm without murmur rub or gallop.  Abdomen soft nontender no organomegaly.  No peritoneal signs.  Extremities free of edema cyanosis or clubbing.  Neck veins nondistended no thyromegaly or scleral icterus noted, carotids full.  Skin warm and dry easily conversant good spirited.  Normal intelligence.  Neurologically intact no gross localizing findings.    136/72 pulse 80 respirations 18 O2 sats 98% BMI 35 weight down 4 pounds from previous.  Exercises each morning by walking 1/2 mile in her condominium.    Assessment and Plan  Hypertension with satisfactory control 136/72.  Stable.    Obesity with insulin resistance and resultant diabetes mellitus type 2 needs to lose weight with caloric restriction regular exercise.    Diabetes mellitus type 2 check potassium level plus blood sugar A1c lipid panel today.  Patient is on lisinopril for blood pressure control obviating the need for urine for microalbumin at this time.    Bilateral leg weakness after prior lumbar back surgery.    Recent diagnosis of macular degeneration dry in the right eye it went in the left.  Intolerant of a Avastin injections left eye because of extreme pain even ibuprofen would help.  Defer to  ophthalmology regarding continuation of same.    Be staying venom allergy with anaphylaxis.    Time: total time spent with the patient was 25 minutes of which >50% was spent in counseling and coordination of care    The following high BMI interventions were performed this visit: encouragement to exercise    Delmer Back MD    Patient Active Problem List   Diagnosis     Hyperglycemia     Essential Hypertension     Lumbago     Sciatica     Obesity (BMI 35.0-39.9) with comorbidity (H)     Type 2 diabetes mellitus with complication, without long-term current use of insulin (H)

## 2021-06-05 NOTE — TELEPHONE ENCOUNTER
RN cannot approve Refill Request    RN can NOT refill this medication PCP messaged that patient is overdue for Labs. Last office visit: 1/21/2020 Delmer Back MD Last Physical: 7/28/2016 Last MTM visit: Visit date not found Last visit same specialty: 1/21/2020 Delmer Back MD.    Rosmery Beckman, [facility] Triage/Med Refill 2/2/2020    Requested Prescriptions   Pending Prescriptions Disp Refills     hydroCHLOROthiazide (HYDRODIURIL) 25 MG tablet [Pharmacy Med Name: HYDROCHLOROTHIAZIDE 25MG TABLETS] 90 tablet 0     Sig: TAKE 1 TABLET(25 MG) BY MOUTH DAILY       Diuretics/Combination Diuretics Refill Protocol  Failed - 2/1/2020 10:14 AM        Failed - Serum Sodium in past 12 months      No results found for: LN-SODIUM          Failed - Serum Creatinine in past 12 months      Creatinine   Date Value Ref Range Status   07/28/2016 0.81 0.60 - 1.10 mg/dL Final     Comment:       New Creatinine method with new reference ranges as of 9/14/15             Passed - Visit with PCP or prescribing provider visit in past 12 months     Last office visit with prescriber/PCP: 1/21/2020 Delmer Back MD OR same dept: 1/21/2020 Delmer Back MD OR same specialty: 1/21/2020 Delmer Back MD  Last physical: 7/28/2016 Last MTM visit: Visit date not found   Next visit within 3 mo: Visit date not found  Next physical within 3 mo: Visit date not found  Prescriber OR PCP: Delmer Back MD  Last diagnosis associated with med order: 1. Essential hypertension  - hydroCHLOROthiazide (HYDRODIURIL) 25 MG tablet [Pharmacy Med Name: HYDROCHLOROTHIAZIDE 25MG TABLETS]; TAKE 1 TABLET(25 MG) BY MOUTH DAILY  Dispense: 90 tablet; Refill: 0    If protocol passes may refill for 12 months if within 3 months of last provider visit (or a total of 15 months).             Passed - Serum Potassium in past 12 months      Lab Results   Component Value Date    Potassium 4.3 01/21/2020             Passed - Blood  pressure on file in past 12 months     BP Readings from Last 1 Encounters:   01/21/20 136/72

## 2021-06-08 NOTE — TELEPHONE ENCOUNTER
RN cannot approve Refill Request    RN can NOT refill this medication Protocol failed and NO refill given.    Georgina Lau, [facility] Triage/Med Refill 5/12/2020    Requested Prescriptions   Pending Prescriptions Disp Refills     hydroCHLOROthiazide (HYDRODIURIL) 25 MG tablet [Pharmacy Med Name: HYDROCHLOROTHIAZIDE 25MG TABLETS] 90 tablet 0     Sig: TAKE 1 TABLET BY MOUTH DAILY       Diuretics/Combination Diuretics Refill Protocol  Failed - 5/11/2020 10:54 AM        Failed - Serum Sodium in past 12 months      No results found for: LN-SODIUM          Failed - Serum Creatinine in past 12 months      Creatinine   Date Value Ref Range Status   07/28/2016 0.81 0.60 - 1.10 mg/dL Final     Comment:       New Creatinine method with new reference ranges as of 9/14/15             Passed - Visit with PCP or prescribing provider visit in past 12 months     Last office visit with prescriber/PCP: 1/21/2020 Delmer Back MD OR same dept: 1/21/2020 Delmer Back MD OR same specialty: 1/21/2020 Delmer Back MD  Last physical: 7/28/2016 Last MTM visit: Visit date not found   Next visit within 3 mo: Visit date not found  Next physical within 3 mo: Visit date not found  Prescriber OR PCP: Delmer Back MD  Last diagnosis associated with med order: 1. Essential hypertension  - hydroCHLOROthiazide (HYDRODIURIL) 25 MG tablet [Pharmacy Med Name: HYDROCHLOROTHIAZIDE 25MG TABLETS]; TAKE 1 TABLET BY MOUTH DAILY  Dispense: 90 tablet; Refill: 0    If protocol passes may refill for 12 months if within 3 months of last provider visit (or a total of 15 months).             Passed - Serum Potassium in past 12 months      Lab Results   Component Value Date    Potassium 4.3 01/21/2020             Passed - Blood pressure on file in past 12 months     BP Readings from Last 1 Encounters:   01/21/20 136/72

## 2021-06-08 NOTE — PROGRESS NOTES
Office Visit - Follow up    Tia Barron   76 y.o. female    Date of Visit: 1/9/2017    Chief Complaint   Patient presents with     Hypertension     Arthritis     hands worse       Subjective: Hypertension arthritis in hands worse.    Medications reviewed all well-tolerated no blood in stool or urine.  She does have allergy to hornet venom.  She denies chest pain shortness of breath palpitations.    No blood in stool or urine.  Medication list reviewed generally well-tolerated.    Last mammogram dated June 1, 2016 oh clear.  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 Itz Barron retired gynecologist.    ROS: A comprehensive review of systems was performed and was otherwise negative    Medications:  Prior to Admission medications    Medication Sig Start Date End Date Taking? Authorizing Provider   amLODIPine (NORVASC) 10 MG tablet TAKE ONE TABLET BY MOUTH EVERY DAY 7/28/16  Yes Delmer Back MD   aspirin 325 MG EC tablet Take 325 mg by mouth daily.   Yes Delmer Back MD   calcium-vitamin D (CALCIUM-VITAMIN D) 500 mg(1,250mg) -200 unit per tablet Take 1 tablet by mouth. Daily as directed.    Yes Delmer Back MD   hydrochlorothiazide (HYDRODIURIL) 25 MG tablet Take 1 tablet (25 mg total) by mouth daily. 7/28/16  Yes Delmer Back MD   EPINEPHrine (EPIPEN) 0.3 mg/0.3 mL atIn Inject 0.3 mL (0.3 mg total) into the shoulder, thigh, or buttocks once. Inject intramuscularly as directed. 4/20/16   Delmer Back MD   metoprolol succinate (TOPROL-XL) 50 MG 24 hr tablet TAKE 1/2 TABLET BY MOUTH DAILY 7/28/16   Delmer Back MD       Allergies:   Allergies   Allergen Reactions     Hornet Venom Anaphylaxis       Immunizations:   Immunization History   Administered Date(s) Administered     DT (pediatric) 12/29/2004     Influenza, inj,  historic 08/19/2012, 09/07/2015     Influenza, seasonal,quad inj 6-35 mos 08/25/2011     Pneumo Conj 13-V (2010&after) 07/28/2016     Pneumo Polysac 23-V 12/29/2004     Td, historic 12/29/2004     Tdap 06/03/2013     ZOSTER 02/27/2008       Exam Chest clear to auscultation and percussion.  Heart tones regular rhythm without murmur rub or gallop.  Abdomen soft nontender no organomegaly.  No peritoneal signs.  Extremities free of edema cyanosis or clubbing.  Neck veins nondistended no thyromegaly or scleral icterus noted, carotids full.  Skin warm and dry easily conversant good spirited.  Normal intelligence.  Neurologically intact no gross localizing findings.      Assessment and Plan   Hypertension with improved control same meds and cares RTC 1 month.    Obesity BMI discussed at length.    Arthritis ostial in hands without active synovitis redness or warmth no inflammatory component.    Time: total time spent with the patient was 25 minutes of which >50% was spent in counseling and coordination of care    The following high BMI interventions were performed this visit: encouragement to exercise    Delmer Back MD    Patient Active Problem List   Diagnosis     Hyperglycemia     Essential Hypertension     Lumbago     Sciatica

## 2021-06-09 NOTE — PROGRESS NOTES
Office Visit - Follow up    Tia Barron   80 y.o. female    Date of Visit: 7/14/2020    Chief Complaint   Patient presents with     Hypertension     Joint Swelling       Subjective: Anaphylactoid reaction.  Needs refill of EpiPen.    Hypertension second reading 134/70.  No target organ damage related to same.    Ankle edema attributable to high-dose amlodipine 10 mg daily.  HCTZ helps also on board other meds reviewed for blood pressure.  Well-tolerated.    Medication list reviewed reconciled.    No chest pain or shortness of breath no blood in stool or urine.    Itz Anderson is been dead for 5 years.    ROS: A comprehensive review of systems was performed and was otherwise negative    Medications:  Prior to Admission medications    Medication Sig Start Date End Date Taking? Authorizing Provider   amLODIPine (NORVASC) 10 MG tablet Take 1 tablet (10 mg total) by mouth daily. 7/20/19  Yes Delmer Back MD   aspirin 81 MG EC tablet Take 81 mg by mouth daily.   Yes PROVIDER, HISTORICAL   EPINEPHrine (EPIPEN) 0.3 mg/0.3 mL atIn Inject 0.3 mL (0.3 mg total) into the shoulder, thigh, or buttocks once. Inject intramuscularly as directed. 4/20/16 7/14/20 Yes Delmer Back MD   hydroCHLOROthiazide (HYDRODIURIL) 25 MG tablet TAKE 1 TABLET BY MOUTH DAILY 5/12/20  Yes Delmer Back MD   losartan (COZAAR) 50 MG tablet Take 1 tablet (50 mg total) by mouth daily. 10/21/19  Yes Delmer Back MD   metoprolol succinate (TOPROL-XL) 25 MG Take 2 tablets (50 mg total) by mouth daily. 8/4/19  Yes Delmer Back MD       Allergies:   Allergies   Allergen Reactions     Hornet Venom Anaphylaxis       Immunizations:   Immunization History   Administered Date(s) Administered     DT (pediatric) 12/29/2004     Influenza high dose,seasonal,PF, 65+ yrs 09/06/2015, 09/11/2016, 09/19/2017, 10/01/2018     Influenza, inj, historic,unspecified 08/19/2012, 09/07/2015     Influenza, seasonal,quad inj 6-35 mos  08/25/2011     Pneumo Conj 13-V (2010&after) 07/28/2016     Pneumo Polysac 23-V 12/29/2004     Td,adult,historic,unspecified 12/29/2004     Tdap 06/03/2013     ZOSTER, LIVE 02/27/2008       Exam Chest clear to auscultation and percussion.  Heart tones regular rhythm without murmur rub or gallop.  Abdomen soft nontender no organomegaly.  No peritoneal signs.  Extremities free of edema cyanosis or clubbing.  Neck veins nondistended no thyromegaly or scleral icterus noted, carotids full.  Skin warm and dry easily conversant good spirited.  Normal intelligence.  Neurologically intact no gross localizing findings.    134/70 pulse 73 respirations 18 O2 sats 98% temperature today 98.4.  BMI elevated at 36.  Weight is up 7 pounds from previous.  May be contributing to blood pressure elevation leg edema.  Prior back surgery resulted in right lower extremity edema.    Assessment and Plan  Anaphylactoid reaction.  Refill EpiPen.  This is to bee venom.    Hypertension controlled recheck blood pressure 134/78 no target organ damage related to same.    Dependent edema exacerbated by amlodipine.  Suggest same medications leg elevation salt restriction support stockings as needed.    Obesity encourage patient to lose weight with caloric restriction regular exercise.    Time: total time spent with the patient was 25 minutes of which >50% was spent in counseling and coordination of care    The following high BMI interventions were performed this visit: encouragement to exercise    Delmer Back MD    Patient Active Problem List   Diagnosis     Hyperglycemia     Essential Hypertension     Lumbago     Sciatica     Obesity (BMI 35.0-39.9) with comorbidity (H)     Type 2 diabetes mellitus with complication, without long-term current use of insulin (H)

## 2021-06-09 NOTE — TELEPHONE ENCOUNTER
Refill Approved    Rx renewed per Medication Renewal Policy. Medication was last renewed on 7/20/19, last OV 7/14/20.    Leticia Adan, [facility] Triage/Med Refill 7/19/2020     Requested Prescriptions   Pending Prescriptions Disp Refills     amLODIPine (NORVASC) 10 MG tablet [Pharmacy Med Name: AMLODIPINE BESYLATE 10MG TABLETS] 90 tablet 3     Sig: TAKE 1 TABLET(10 MG) BY MOUTH DAILY       Calcium-Channel Blockers Protocol Passed - 7/18/2020  9:47 AM        Passed - PCP or prescribing provider visit in past 12 months or next 3 months     Last office visit with prescriber/PCP: 7/14/2020 Delmer Back MD OR same dept: 7/14/2020 Delmer Back MD OR same specialty: 7/14/2020 Delmer Back MD  Last physical: 7/28/2016 Last MTM visit: Visit date not found   Next visit within 3 mo: Visit date not found  Next physical within 3 mo: Visit date not found  Prescriber OR PCP: Delmer Back MD  Last diagnosis associated with med order: 1. HTN (hypertension)  - amLODIPine (NORVASC) 10 MG tablet [Pharmacy Med Name: AMLODIPINE BESYLATE 10MG TABLETS]; TAKE 1 TABLET(10 MG) BY MOUTH DAILY  Dispense: 90 tablet; Refill: 3    If protocol passes may refill for 12 months if within 3 months of last provider visit (or a total of 15 months).             Passed - Blood pressure filed in past 12 months     BP Readings from Last 1 Encounters:   07/14/20 134/70

## 2021-06-09 NOTE — PROGRESS NOTES
Office Visit - Follow up    Tia Barron   77 y.o. female    Date of Visit: 3/6/2017    Chief Complaint   Patient presents with     Hypertension       Subjective: Hypertension not adequately controlled.  Blood pressure high below see ×3 166/76.    No headache dizziness lightheadedness no chest pain or shortness of breath.    No blood in stool or urine medication list reviewed well-tolerated.    Denies palpitations she does have edema on amlodipine high dose 10 mg daily.    Mammogram negative June 1, 2016 colonoscopy normal November 19, 2004.    Patient has had edema lower extremities attributable to high-dose amlodipine 10 mg daily other meds and cares reviewed generally well-tolerated.    ROS: A comprehensive review of systems was performed and was otherwise negative    Medications:  Prior to Admission medications    Medication Sig Start Date End Date Taking? Authorizing Provider   amLODIPine (NORVASC) 10 MG tablet TAKE ONE TABLET BY MOUTH EVERY DAY 7/28/16  Yes Delmer Back MD   aspirin 325 MG EC tablet Take 325 mg by mouth daily.   Yes Delmer Back MD   hydrochlorothiazide (HYDRODIURIL) 25 MG tablet Take 1 tablet (25 mg total) by mouth daily. 7/28/16  Yes Delmer Back MD   metoprolol succinate (TOPROL-XL) 50 MG 24 hr tablet TAKE 1/2 TABLET BY MOUTH DAILY 3/6/17  Yes Delmer Back MD   metoprolol succinate (TOPROL-XL) 50 MG 24 hr tablet TAKE 1/2 TABLET BY MOUTH DAILY 7/28/16 3/6/17 Yes Delmer Back MD   calcium-vitamin D (CALCIUM-VITAMIN D) 500 mg(1,250mg) -200 unit per tablet Take 1 tablet by mouth. Daily as directed.     Delmer Back MD   EPINEPHrine (EPIPEN) 0.3 mg/0.3 mL atIn Inject 0.3 mL (0.3 mg total) into the shoulder, thigh, or buttocks once. Inject intramuscularly as directed. 4/20/16   Delmer Back MD       Allergies:   Allergies   Allergen Reactions     Hornet Venom Anaphylaxis       Immunizations:   Immunization History   Administered Date(s)  Administered     DT (pediatric) 12/29/2004     Influenza, inj, historic 08/19/2012, 09/07/2015     Influenza, seasonal,quad inj 6-35 mos 08/25/2011     Pneumo Conj 13-V (2010&after) 07/28/2016     Pneumo Polysac 23-V 12/29/2004     Td, historic 12/29/2004     Tdap 06/03/2013     ZOSTER 02/27/2008       Exam Chest clear to auscultation and percussion.  Heart tones regular rhythm without murmur rub or gallop.  Abdomen soft nontender no organomegaly.  No peritoneal signs.  Extremities free of edema cyanosis or clubbing.  Neck veins nondistended no thyromegaly or scleral icterus noted, carotids full.  Skin warm and dry easily conversant good spirited.  Normal intelligence.  Neurologically intact no gross localizing findings.    Assessment and Plan  Hypertension not adequately controlled.  Increase metoprolol to 25 mg twice daily or 50 mg daily.    Other meds and cares same.    Obesity BMI 36 see below.    KATHLEEN exacerbated by high-dose amlodipine therapy.    Drug allergies hornet venom.    Time: total time spent with the patient was 25 minutes of which >50% was spent in counseling and coordination of care    The following high BMI interventions were performed this visit: encouragement to exercise return to clinic 1 month.    Delmer Back MD    Patient Active Problem List   Diagnosis     Hyperglycemia     Essential Hypertension     Lumbago     Sciatica

## 2021-06-10 NOTE — PROGRESS NOTES
Office Visit - Follow up    Tia Barron   77 y.o. female    Date of Visit: 4/13/2017    Chief Complaint   Patient presents with     Hypertension     Metoprolol was increased to 50mg daily       Subjective: Hypertension.    Recent dose of metoprolol increased to 50 mg daily from 25 mg daily.    She is allergic to hornet venom other meds are reviewed and well-tolerated especially Hydro diarrheal and amlodipine at high dose 10 mg daily for hypertension.  No history of target organ damage.  She has not been exercising as much achieved like.  Her weight is down 1 pound from last visit.    No blood in stool or urine no chest pain shortness of breath or exertional syncope.  She denies palpitations.    Medication list reviewed all clear.  Last mammogram dated June 1, 2016 allCLEAR as well.    ROS: A comprehensive review of systems was performed and was otherwise negative    Medications:  Prior to Admission medications    Medication Sig Start Date End Date Taking? Authorizing Provider   amLODIPine (NORVASC) 10 MG tablet TAKE ONE TABLET BY MOUTH EVERY DAY 7/28/16  Yes Delmer Back MD   aspirin 325 MG EC tablet Take 325 mg by mouth daily.   Yes Delmer Back MD   calcium-vitamin D (CALCIUM-VITAMIN D) 500 mg(1,250mg) -200 unit per tablet Take 1 tablet by mouth. Daily as directed.    Yes Delmer Back MD   hydrochlorothiazide (HYDRODIURIL) 25 MG tablet Take 1 tablet (25 mg total) by mouth daily. 7/28/16  Yes Delmer Back MD   metoprolol succinate (TOPROL XL) 25 MG Take two tablets daily. 4/3/17  Yes Delmer Back MD   EPINEPHrine (EPIPEN) 0.3 mg/0.3 mL atIn Inject 0.3 mL (0.3 mg total) into the shoulder, thigh, or buttocks once. Inject intramuscularly as directed. 4/20/16   Delmer Back MD       Allergies:   Allergies   Allergen Reactions     Hornet Venom Anaphylaxis       Immunizations:   Immunization History   Administered Date(s) Administered     DT (pediatric) 12/29/2004      Influenza, inj, historic 08/19/2012, 09/07/2015     Influenza, seasonal,quad inj 6-35 mos 08/25/2011     Pneumo Conj 13-V (2010&after) 07/28/2016     Pneumo Polysac 23-V 12/29/2004     Td, historic 12/29/2004     Tdap 06/03/2013     ZOSTER 02/27/2008       Exam Chest clear to auscultation and percussion.  Heart tones regular rhythm without murmur rub or gallop.  Abdomen soft nontender no organomegaly.  No peritoneal signs.  Extremities free of edema cyanosis or clubbing.  Neck veins nondistended no thyromegaly or scleral icterus noted, carotids full.  Skin warm and dry easily conversant good spirited.  Normal intelligence.  Neurologically intact no gross localizing findings.    Assessment and Plan  Hypertension with adequate control at 130 oh 8/80.  Same meds and cares advised suggest weight loss with salt restriction and diet regular exercise plan.    Obesity BMI 36 discussed see below.    Hornet venom allergy.        Time: total time spent with the patient was 25 minutes of which >50% was spent in counseling and coordination of care    The following high BMI interventions were performed this visit: encouragement to exercise    Delmer Back MD    Patient Active Problem List   Diagnosis     Hyperglycemia     Essential Hypertension     Lumbago     Sciatica

## 2021-06-10 NOTE — TELEPHONE ENCOUNTER
Refill Approved    Rx renewed per Medication Renewal Policy. Medication was last renewed on 8/4/19.    Georgina Lau, [facility] Triage/Med Refill 8/3/2020     Requested Prescriptions   Pending Prescriptions Disp Refills     metoprolol succinate (TOPROL-XL) 25 MG [Pharmacy Med Name: METOPROLOL ER SUCCINATE 25MG TABS] 180 tablet 3     Sig: TAKE 2 TABLETS(50 MG) BY MOUTH DAILY       Beta-Blockers Refill Protocol Passed - 8/2/2020  4:14 PM        Passed - PCP or prescribing provider visit in past 12 months or next 3 months     Last office visit with prescriber/PCP: 7/14/2020 Delmer Back MD OR same dept: 7/14/2020 Delmer Back MD OR same specialty: 7/14/2020 Delmer Back MD  Last physical: 7/28/2016 Last MTM visit: Visit date not found   Next visit within 3 mo: Visit date not found  Next physical within 3 mo: Visit date not found  Prescriber OR PCP: Delmer Back MD  Last diagnosis associated with med order: 1. Routine general medical examination at a health care facility  - metoprolol succinate (TOPROL-XL) 25 MG [Pharmacy Med Name: METOPROLOL ER SUCCINATE 25MG TABS]; TAKE 2 TABLETS(50 MG) BY MOUTH DAILY  Dispense: 180 tablet; Refill: 3    If protocol passes may refill for 12 months if within 3 months of last provider visit (or a total of 15 months).             Passed - Blood pressure filed in past 12 months     BP Readings from Last 1 Encounters:   07/14/20 134/70

## 2021-06-10 NOTE — TELEPHONE ENCOUNTER
RN cannot approve Refill Request    RN can NOT refill this medication Protocol failed and NO refill given. Last office visit: 7/14/2020 Delmer Back MD Last Physical: 7/28/2016 Last MTM visit: Visit date not found Last visit same specialty: 7/14/2020 Delmer Back MD.  Next visit within 3 mo: Visit date not found  Next physical within 3 mo: Visit date not found      Georgina Lau, [facility] Triage/Med Refill 8/10/2020    Requested Prescriptions   Pending Prescriptions Disp Refills     losartan (COZAAR) 50 MG tablet [Pharmacy Med Name: LOSARTAN 50MG TABLETS] 60 tablet 0     Sig: TAKE 1 TABLET(50 MG) BY MOUTH DAILY       Angiotensin Receptor Blocker Protocol Failed - 8/9/2020 12:17 PM        Failed - Serum creatinine within the past 12 months     Creatinine   Date Value Ref Range Status   07/28/2016 0.81 0.60 - 1.10 mg/dL Final     Comment:       New Creatinine method with new reference ranges as of 9/14/15             Passed - PCP or prescribing provider visit in past 12 months       Last office visit with prescriber/PCP: 7/14/2020 Delmer Back MD OR same dept: 7/14/2020 Delmer Back MD OR same specialty: 7/14/2020 Delmer Back MD  Last physical: 7/28/2016 Last MTM visit: Visit date not found   Next visit within 3 mo: Visit date not found  Next physical within 3 mo: Visit date not found  Prescriber OR PCP: Delmer Back MD  Last diagnosis associated with med order: 1. Essential hypertension  - losartan (COZAAR) 50 MG tablet [Pharmacy Med Name: LOSARTAN 50MG TABLETS]; TAKE 1 TABLET(50 MG) BY MOUTH DAILY  Dispense: 60 tablet; Refill: 0    If protocol passes may refill for 12 months if within 3 months of last provider visit (or a total of 15 months).             Passed - Serum potassium within the past 12 months     Lab Results   Component Value Date    Potassium 4.3 01/21/2020             Passed - Blood pressure filed in past 12 months     BP Readings from Last 1  Encounters:   07/14/20 134/70

## 2021-06-11 NOTE — PROGRESS NOTES
Office Visit - Follow up    Tia Barron   77 y.o. female    Date of Visit: 6/29/2017    Chief Complaint   Patient presents with     Hypertension     blood pressure check       Subjective: Hypertension.    Patient's blood pressure initially elevated 168/68 recheck 142/80.  Patient's med list was reviewed in detail.  She denies chest pain or shortness of breath no blood in stool or urine no syncopal spells or palpitations.        ROS: A comprehensive review of systems was performed and was otherwise negative    Medications:  Prior to Admission medications    Medication Sig Start Date End Date Taking? Authorizing Provider   amLODIPine (NORVASC) 10 MG tablet TAKE ONE TABLET BY MOUTH EVERY DAY 7/28/16  Yes Delmer Back MD   aspirin 325 MG EC tablet Take 325 mg by mouth daily.   Yes Delmer Back MD   hydrochlorothiazide (HYDRODIURIL) 25 MG tablet Take 1 tablet (25 mg total) by mouth daily. 7/28/16  Yes Delmer Back MD   metoprolol succinate (TOPROL-XL) 25 MG TAKE 2 TABLETS BY MOUTH DAILY 5/14/17  Yes Delmer Back MD   calcium-vitamin D (CALCIUM-VITAMIN D) 500 mg(1,250mg) -200 unit per tablet Take 1 tablet by mouth. Daily as directed.     Delmer Back MD   EPINEPHrine (EPIPEN) 0.3 mg/0.3 mL atIn Inject 0.3 mL (0.3 mg total) into the shoulder, thigh, or buttocks once. Inject intramuscularly as directed. 4/20/16   Delmer Back MD   amLODIPine (NORVASC) 10 MG tablet TAKE 1 TABLET BY MOUTH DAILY 4/24/17 6/29/17  Delmer Back MD       Allergies:   Allergies   Allergen Reactions     Hornet Venom Anaphylaxis       Immunizations:   Immunization History   Administered Date(s) Administered     DT (pediatric) 12/29/2004     Influenza, inj, historic 08/19/2012, 09/07/2015     Influenza, seasonal,quad inj 6-35 mos 08/25/2011     Pneumo Conj 13-V (2010&after) 07/28/2016     Pneumo Polysac 23-V 12/29/2004     Td, historic 12/29/2004     Tdap 06/03/2013     ZOSTER 02/27/2008       Exam  Chest clear to auscultation and percussion.  Heart tones regular rhythm without murmur rub or gallop.  Abdomen soft nontender no organomegaly.  No peritoneal signs.  Extremities free of edema cyanosis or clubbing.  Neck veins nondistended no thyromegaly or scleral icterus noted, carotids full.  Skin warm and dry easily conversant good spirited.  Normal intelligence.  Neurologically intact no gross localizing findings.    Assessment and Plan  Labile hypertension not adequately controlled same meds and cares suggest more exercise weight loss BMI elevated at 35.48.    Obesity see below.    History of hyperglycemia.  With next office visit fasting labs to be done including A1c blood sugar lipid panel urine for microalbumin.    Time: total time spent with the patient was 25 minutes of which >50% was spent in counseling and coordination of care    The following high BMI interventions were performed this visit: encouragement to exercise    Delmer Back MD    Patient Active Problem List   Diagnosis     Hyperglycemia     Essential Hypertension     Lumbago     Sciatica

## 2021-06-12 NOTE — PROGRESS NOTES
Office Visit - Follow up    Tia Barron   77 y.o. female    Date of Visit: 8/10/2017    Chief Complaint   Patient presents with     Hypertension     blood pressure check       Subjective: Hypertension.    77-year-old female with history of hypertension and allergy to hornet venom the patient's medication list was reviewed in detail she has had a history of low back pain which is quiescent of late also obesity her BMI is 36 we did discuss her weight elevation may be contributing to her blood pressure lack of control Therien.    No blood in stool or urine no chest pain or shortness of breath she denies exertional syncope or palpitations.    Medication list reviewed generally well-tolerated.  Higher dose of metoprolol done well at 50 mg metoprolol succinate daily.    ROS: A comprehensive review of systems was performed and was otherwise negative    Medications:  Prior to Admission medications    Medication Sig Start Date End Date Taking? Authorizing Provider   amLODIPine (NORVASC) 10 MG tablet TAKE ONE TABLET BY MOUTH EVERY DAY 7/28/16  Yes Delmer Back MD   aspirin 325 MG EC tablet Take 325 mg by mouth daily.   Yes Delmer Back MD   calcium-vitamin D (CALCIUM-VITAMIN D) 500 mg(1,250mg) -200 unit per tablet Take 1 tablet by mouth. Daily as directed.    Yes Delmer Back MD   hydrochlorothiazide (HYDRODIURIL) 25 MG tablet Take 1 tablet (25 mg total) by mouth daily. 7/28/16  Yes Delmer Back MD   metoprolol succinate (TOPROL-XL) 25 MG TAKE 2 TABLETS BY MOUTH DAILY 8/10/17  Yes Delmer Back MD   metoprolol succinate (TOPROL-XL) 25 MG TAKE 2 TABLETS BY MOUTH DAILY 5/14/17 8/10/17 Yes Delmer Back MD   EPINEPHrine (EPIPEN) 0.3 mg/0.3 mL atIn Inject 0.3 mL (0.3 mg total) into the shoulder, thigh, or buttocks once. Inject intramuscularly as directed. 4/20/16   Delmer Back MD   amLODIPine (NORVASC) 10 MG tablet TAKE 1 TABLET BY MOUTH DAILY 7/21/17 8/10/17  Delmer Back  MD       Allergies:   Allergies   Allergen Reactions     Hornet Venom Anaphylaxis       Immunizations:   Immunization History   Administered Date(s) Administered     DT (pediatric) 12/29/2004     Influenza, inj, historic 08/19/2012, 09/07/2015     Influenza, seasonal,quad inj 6-35 mos 08/25/2011     Pneumo Conj 13-V (2010&after) 07/28/2016     Pneumo Polysac 23-V 12/29/2004     Td, historic 12/29/2004     Tdap 06/03/2013     ZOSTER 02/27/2008       Exam Chest clear to auscultation and percussion.  Heart tones regular rhythm without murmur rub or gallop.  Abdomen soft nontender no organomegaly.  No peritoneal signs.  Extremities free of edema cyanosis or clubbing.  Neck veins nondistended no thyromegaly or scleral icterus noted, carotids full.  Skin warm and dry easily conversant good spirited.  Normal intelligence.  Neurologically intact no gross localizing findings.  BMI elevated at 36 discussed see below.    Assessment and Plan  Hypertension with improved control 136/76 pulse 72 and regular.    Obesity see below.    Hornet venom allergy.    Low back pain quiescent at this time.    History of hyperglycemia may be early onset type 2 diabetes.  With next office visit we will check fasting labs to include A1c lipid panel blood sugar urine for microalbumin.    Time: total time spent with the patient was 25 minutes of which >50% was spent in counseling and coordination of care    The following high BMI interventions were performed this visit: encouragement to exercise    Delmer Back MD    Patient Active Problem List   Diagnosis     Hyperglycemia     Essential Hypertension     Lumbago     Sciatica

## 2021-06-13 NOTE — PROGRESS NOTES
Office Visit - Follow up    Tia Barron   77 y.o. female    Date of Visit: 10/24/2017    Chief Complaint   Patient presents with     Hypertension      fasting       Subjective: HTN    Obesity discussed BMI 36+.    Last mammogram June 6, 2017 allCLEAR.    Offers no new complaints.  Patient was in a hurry to get here she had trouble parking.  She denies chest pain shortness of breath no blood in stool or urine.    Medication list reviewed well-tolerated normal effects.    ROS: A comprehensive review of systems was performed and was otherwise negative    Medications:  Prior to Admission medications    Medication Sig Start Date End Date Taking? Authorizing Provider   amLODIPine (NORVASC) 10 MG tablet TAKE ONE TABLET BY MOUTH EVERY DAY 7/28/16  Yes Delmer Back MD   aspirin 325 MG EC tablet Take 325 mg by mouth daily.   Yes Delmer Back MD   calcium carbonate-vitamin D3 (CALCIUM 500 + D) 500 mg(1,250mg) -400 unit per tablet Chew 1 tablet daily.   Yes PROVIDER, HISTORICAL   hydroCHLOROthiazide (HYDRODIURIL) 25 MG tablet TAKE 1 TABLET(25 MG) BY MOUTH DAILY 8/25/17  Yes Delmer Back MD   metoprolol succinate (TOPROL-XL) 25 MG TAKE 2 TABLETS BY MOUTH DAILY 8/10/17  Yes Delmer Back MD   EPINEPHrine (EPIPEN) 0.3 mg/0.3 mL atIn Inject 0.3 mL (0.3 mg total) into the shoulder, thigh, or buttocks once. Inject intramuscularly as directed. 4/20/16   Delmer Back MD   amLODIPine (NORVASC) 10 MG tablet TAKE 1 TABLET BY MOUTH DAILY 10/19/17 10/24/17  Delmer Back MD   calcium-vitamin D (CALCIUM-VITAMIN D) 500 mg(1,250mg) -200 unit per tablet Take 1 tablet by mouth. Daily as directed.   10/24/17  Delmer Back MD       Allergies:   Allergies   Allergen Reactions     Hornet Venom Anaphylaxis       Immunizations:   Immunization History   Administered Date(s) Administered     DT (pediatric) 12/29/2004     Influenza, inj, historic 08/19/2012, 09/07/2015     Influenza, seasonal,quad inj 6-35  mos 08/25/2011     Pneumo Conj 13-V (2010&after) 07/28/2016     Pneumo Polysac 23-V 12/29/2004     Td, historic 12/29/2004     Tdap 06/03/2013     ZOSTER 02/27/2008       Exam Chest clear to auscultation and percussion.  Heart tones regular rhythm without murmur rub or gallop.  Abdomen soft nontender no organomegaly.  No peritoneal signs.  Extremities free of edema cyanosis or clubbing.  Neck veins nondistended no thyromegaly or scleral icterus noted, carotids full.  Skin warm and dry easily conversant good spirited.  Normal intelligence.  Neurologically intact no gross localizing findings..  Blood pressures at least 3 taken right arm sitting best 148/75 pulse 72 respirations 18    Assessment and Plan  Hypertension not optimally controlled.  Suggest weight loss program with regular doses of exercise and salt restriction.    Obesity.    Hyperglycemia by history.  Check A1c blood sugar today.    Time: total time spent with the patient was 25 minutes of which >50% was spent in counseling and coordination of care    The following high BMI interventions were performed this visit: encouragement to exercise    Delmer Back MD    Patient Active Problem List   Diagnosis     Hyperglycemia     Essential Hypertension     Lumbago     Sciatica

## 2021-06-14 NOTE — TELEPHONE ENCOUNTER
Daughter Georgina is calling.     Pt rcvd first Covid Vaccine on 1/21/21 in [facility]ine.    She is scheduled for 2nd dose next week. However, family would not like to have the second dose administered in [facility].    Was advised that this could be done but would required order from Dr. Back.    Please call Georgina with update at

## 2021-06-14 NOTE — TELEPHONE ENCOUNTER
Called patient to let her know that we are not able to schedule her for a 2nd shot, patient needs to return to [place]

## 2021-06-15 NOTE — PROGRESS NOTES
Office Visit - Follow up    Tia Barron   77 y.o. female    Date of Visit: 1/4/2018    Chief Complaint   Patient presents with     Hypertension       Subjective: HTN    Outside blood pressure readings remain elevated.  Medication list for blood pressure control including HCTZ 25 mg daily metoprolol succinate 25 mg daily and amlodipine 10 mg daily reviewed.  Patient also on low-dose aspirin.  I believe 325 mg daily.  No history of target organ damage related to hypertension.  No history of MI stroke chronic kidney disease CHF or atrial fibrillation.  Patient avoid salt in her diet tries to exercise more of her weight is up her BMI is 36.  Weight is down 1 pound.    Outside blood pressure readings reviewed.    ROS: A comprehensive review of systems was performed and was otherwise negative    Medications:  Prior to Admission medications    Medication Sig Start Date End Date Taking? Authorizing Provider   amLODIPine (NORVASC) 10 MG tablet TAKE ONE TABLET BY MOUTH EVERY DAY 7/28/16  Yes Delmer Back MD   aspirin 325 MG EC tablet Take 325 mg by mouth daily.   Yes Delmer Back MD   calcium carbonate (CALCIUM 600) 600 mg calcium (1,500 mg) tablet Take 600 mg by mouth 2 (two) times a day with meals.   Yes PROVIDER, HISTORICAL   hydroCHLOROthiazide (HYDRODIURIL) 25 MG tablet TAKE 1 TABLET(25 MG) BY MOUTH DAILY 11/18/17  Yes Delmer Back MD   metoprolol succinate (TOPROL-XL) 25 MG TAKE 2 TABLETS BY MOUTH DAILY 8/10/17  Yes Delmer Back MD   EPINEPHrine (EPIPEN) 0.3 mg/0.3 mL atIn Inject 0.3 mL (0.3 mg total) into the shoulder, thigh, or buttocks once. Inject intramuscularly as directed. 4/20/16   Delmer Back MD   lisinopril (PRINIVIL,ZESTRIL) 5 MG tablet Take 1 tablet (5 mg total) by mouth daily. 1/4/18   Delmer Back MD   calcium carbonate-vitamin D3 (CALCIUM 500 + D) 500 mg(1,250mg) -400 unit per tablet Chew 1 tablet daily.  1/4/18  PROVIDER, HISTORICAL   hydroCHLOROthiazide  (HYDRODIURIL) 25 MG tablet TAKE 1 TABLET(25 MG) BY MOUTH DAILY 8/25/17 1/4/18  Delmer Back MD       Allergies:   Allergies   Allergen Reactions     Hornet Venom Anaphylaxis       Immunizations:   Immunization History   Administered Date(s) Administered     DT (pediatric) 12/29/2004     Influenza, inj, historic,unspecified 08/19/2012, 09/07/2015     Influenza, seasonal,quad inj 6-35 mos 08/25/2011     Pneumo Conj 13-V (2010&after) 07/28/2016     Pneumo Polysac 23-V 12/29/2004     Td,adult,historic,unspecified 12/29/2004     Tdap 06/03/2013     ZOSTER 02/27/2008       Exam Chest clear to auscultation and percussion.  Heart tones regular rhythm without murmur rub or gallop.  Abdomen soft nontender no organomegaly.  No peritoneal signs.  Extremities free of edema cyanosis or clubbing.  Neck veins nondistended no thyromegaly or scleral icterus noted, carotids full.  Skin warm and dry easily conversant good spirited.  Normal intelligence.  Neurologically intact no gross localizing findings.      Assessment and Plan  Hypertension especially systolic not adequately controlled add lisinopril 5 mg daily.  Current regimen reemphasized salt restriction and diet plus exercise weight loss.    Obesity BMI 36.    Bee sting allergy.    Negative mammogram June 6, 2017.    History of hyperglycemia with next office visit in 6 weeks time we will do fasting labs to include A1c blood sugar urine for microalbumin and lipid panel.    Time: total time spent with the patient was 25 minutes of which >50% was spent in counseling and coordination of care    The following high BMI interventions were performed this visit: encouragement to exercise    Delmer Back MD    Patient Active Problem List   Diagnosis     Hyperglycemia     Essential Hypertension     Lumbago     Sciatica

## 2021-06-16 PROBLEM — E66.01 MORBID OBESITY (H): Status: ACTIVE | Noted: 2019-07-08

## 2021-06-16 PROBLEM — E11.8 TYPE 2 DIABETES MELLITUS WITH COMPLICATION, WITHOUT LONG-TERM CURRENT USE OF INSULIN (H): Status: ACTIVE | Noted: 2019-07-08

## 2021-06-16 NOTE — PROGRESS NOTES
"    Assessment & Plan   Diabetes mellitus type II stable.  Patient does not want lab testing done today like A1c blood sugar lipid panel.    Hypertension controlled for age.  Recheck 144/70.    Covid vaccine has been administered from [employer] 2 shots last 1 February 11, 2021.    Obesity with BMI 36+ an insulin resistance.  Discussed the importance of weight loss with caloric restriction regular exercise.    Review of external notes as documented in note  25 minutes spent on the date of the encounter doing chart review, patient visit and documentation        BMI:   Estimated body mass index is 36.21 kg/m  as calculated from the following:    Height as of this encounter: 5' 2\" (1.575 m).    Weight as of this encounter: 198 lb (89.8 kg).   I have had an Advance Directives discussion with the patient.    No follow-ups on file.    Delmer Back MD  [facility]   Tia Barron is 81 y.o. and presents today for the following health issues   HPI       Hypertension Follow-up      Do you check your blood pressure regularly outside of the clinic? Yes     Are you following a low salt diet? No    Are your blood pressures ever more than 140 on the top number (systolic) OR more       than 90 on the bottom number (diastolic), for example 140/90? No      How many servings of fruits and vegetables do you eat daily?  0-1    On average, how many sweetened beverages do you drink each day (Examples: soda, juice, sweet tea, etc.  Do NOT count diet or artificially sweetened beverages)?   0    How many days per week do you exercise enough to make your heart beat faster? 3 or less    How many minutes a day do you exercise enough to make your heart beat faster? 9 or less    How many days per week do you miss taking your medication? 0        Review of Systems  No blood in stool or urine no chest pain or shortness of breath.    Medication list reviewed reconciled in the chart she is allergic to bee " "venom.  The patient does not smoke or abuse alcohol she has been a  for 6 years.  Her oldest son is a  in [employer].      Objective    /70   Pulse 80   Temp 97.3  F (36.3  C)   Ht 5' 2\" (1.575 m)   Wt 198 lb (89.8 kg)   LMP 05/26/1978   SpO2 97%   BMI 36.21 kg/m    Body mass index is 36.21 kg/m .  Physical Exam  Chest clear to auscultation and percussion.  Heart tones regular rhythm without murmur rub or gallop.  Abdomen soft nontender no organomegaly.  No peritoneal signs.  Extremities free of edema cyanosis or clubbing.  Neck veins nondistended no thyromegaly or scleral icterus noted, carotids full.  Skin warm and dry easily conversant good spirited.  Normal intelligence.  Neurologically intact no gross localizing findings.              "

## 2021-06-17 NOTE — TELEPHONE ENCOUNTER
Refill Approved    Rx renewed per Medication Renewal Policy. Medication was last renewed on 5/12/20, last OV 3/18/21.    Leticia Adan, [facility] Triage/Med Refill 5/1/2021     Requested Prescriptions   Pending Prescriptions Disp Refills     hydroCHLOROthiazide (HYDRODIURIL) 25 MG tablet [Pharmacy Med Name: HYDROCHLOROTHIAZIDE 25MG TABLETS] 90 tablet 3     Sig: TAKE 1 TABLET BY MOUTH DAILY       Diuretics/Combination Diuretics Refill Protocol  Passed - 5/1/2021  6:44 AM        Passed - Visit with PCP or prescribing provider visit in past 12 months     Last office visit with prescriber/PCP: 3/18/2021 Delmer Back MD OR same dept: 11/17/2020 Delmer Back MD OR same specialty: 3/18/2021 Delmer Back MD  Last physical: Visit date not found Last MTM visit: Visit date not found   Next visit within 3 mo: Visit date not found  Next physical within 3 mo: Visit date not found  Prescriber OR PCP: Delmer Back MD  Last diagnosis associated with med order: 1. Essential hypertension  - hydroCHLOROthiazide (HYDRODIURIL) 25 MG tablet [Pharmacy Med Name: HYDROCHLOROTHIAZIDE 25MG TABLETS]; TAKE 1 TABLET BY MOUTH DAILY  Dispense: 90 tablet; Refill: 3    If protocol passes may refill for 12 months if within 3 months of last provider visit (or a total of 15 months).             Passed - Serum Potassium in past 12 months      Lab Results   Component Value Date    Potassium 3.9 11/17/2020             Passed - Serum Sodium in past 12 months      Lab Results   Component Value Date    Sodium 141 11/17/2020             Passed - Blood pressure on file in past 12 months     BP Readings from Last 1 Encounters:   03/18/21 144/70             Passed - Serum Creatinine in past 12 months      Creatinine   Date Value Ref Range Status   11/17/2020 0.87 0.60 - 1.10 mg/dL Final

## 2021-06-17 NOTE — PROGRESS NOTES
Office Visit - Follow up    Tia Barron   78 y.o. female    Date of Visit: 3/29/2018    Chief Complaint   Patient presents with     Hypertension       Subjective: Hypertension.    Obesity BMI 36.    Patient has dependent edema lower extremities exacerbated by higher dose of amlodipine 10 mg daily.    Mammogram allCLEAR June 6, 2017.  Offers no new complaints.  Does have some edema more prominently in the right lower extremity with also some numbness or weakness right lower extremity after prior lumbar back surgery.    Denies chest pain or shortness of breath no blood in stool or urine.  Allergies is hornet venom.    Medication list reviewed generally well-tolerated except for the dependent edema noted most prominently in the right lower extremity secondary to amlodipine therapy.    ROS: A comprehensive review of systems was performed and was otherwise negative    Medications:  Prior to Admission medications    Medication Sig Start Date End Date Taking? Authorizing Provider   amLODIPine (NORVASC) 10 MG tablet TAKE ONE TABLET BY MOUTH EVERY DAY 7/28/16  Yes Delmer Back MD   aspirin 81 MG EC tablet Take 81 mg by mouth daily.   Yes PROVIDER, HISTORICAL   calcium carbonate (CALCIUM 600) 600 mg calcium (1,500 mg) tablet Take 600 mg by mouth 2 (two) times a day with meals.   Yes PROVIDER, HISTORICAL   hydroCHLOROthiazide (HYDRODIURIL) 25 MG tablet TAKE 1 TABLET(25 MG) BY MOUTH DAILY 11/18/17  Yes Delmer Back MD   lisinopril (PRINIVIL,ZESTRIL) 5 MG tablet Take 1 tablet (5 mg total) by mouth daily. 1/4/18  Yes Delmer Back MD   metoprolol succinate (TOPROL-XL) 25 MG TAKE 2 TABLETS BY MOUTH DAILY 8/10/17  Yes Delmer Back MD   EPINEPHrine (EPIPEN) 0.3 mg/0.3 mL atIn Inject 0.3 mL (0.3 mg total) into the shoulder, thigh, or buttocks once. Inject intramuscularly as directed. 4/20/16   Delmer Back MD   amLODIPine (NORVASC) 10 MG tablet TAKE 1 TABLET BY MOUTH DAILY 1/21/18 3/29/18  Delmer  LAWRENCE Back MD   aspirin 325 MG EC tablet Take 325 mg by mouth daily.  3/29/18  Delmer Back MD   hydroCHLOROthiazide (HYDRODIURIL) 25 MG tablet TAKE 1 TABLET(25 MG) BY MOUTH DAILY 2/10/18 3/29/18  Delmer Back MD       Allergies:   Allergies   Allergen Reactions     Hornet Venom Anaphylaxis       Immunizations:   Immunization History   Administered Date(s) Administered     DT (pediatric) 12/29/2004     Influenza, inj, historic,unspecified 08/19/2012, 09/07/2015     Influenza, seasonal,quad inj 6-35 mos 08/25/2011     Pneumo Conj 13-V (2010&after) 07/28/2016     Pneumo Polysac 23-V 12/29/2004     Td,adult,historic,unspecified 12/29/2004     Tdap 06/03/2013     ZOSTER, LIVE 02/27/2008       Exam Chest clear to auscultation and percussion.  Heart tones regular rhythm without murmur rub or gallop.  Abdomen soft nontender no organomegaly.  No peritoneal signs.  Extremities free of edema cyanosis or clubbing.  Neck veins nondistended no thyromegaly or scleral icterus noted, carotids full.  Skin warm and dry easily conversant good spirited.  Normal intelligence.  Neurologically intact no gross localizing findings.    Assessment and Plan  Hypertension was satisfactory control 136/85 right arm sitting.  Third reading today    Obesity BMI 36 see below.    Bee sting venom allergy.    Dependent edema right lower extremity especially after prior    Lumbar back surgery with persistent paresthesias weakness right lower extremity distally.  RTC 2 months time fasting office visit with labs.    Time: total time spent with the patient was 15 minutes of which >50% was spent in counseling and coordination of care    The following high BMI interventions were performed this visit: encouragement to exercise    Delmer Back MD    Patient Active Problem List   Diagnosis     Hyperglycemia     Essential Hypertension     Lumbago     Sciatica

## 2021-06-18 NOTE — PROGRESS NOTES
Office Visit - Follow up    Tia Barron   78 y.o. female    Date of Visit: 6/4/2018    Chief Complaint   Patient presents with     Hypertension       Subjective: Diabetes mellitus type 2 with hypertension dependent edema and low back bothers her.    Prior low back surgery.    Dependent edema with some weakness paresthesias right leg after back surgery.    Hypertension no history of MI or stroke.    No blood in stool or urine medication list reviewed well-tolerated normal effects denies paroxysmal nocturnal dyspnea.    Bee sting venom allergy.    ROS: A comprehensive review of systems was performed and was otherwise negative    Medications:  Prior to Admission medications    Medication Sig Start Date End Date Taking? Authorizing Provider   amLODIPine (NORVASC) 10 MG tablet TAKE ONE TABLET BY MOUTH EVERY DAY 7/28/16  Yes Delmer Back MD   aspirin 81 MG EC tablet Take 81 mg by mouth daily.   Yes PROVIDER, HISTORICAL   calcium carbonate (CALCIUM 600) 600 mg calcium (1,500 mg) tablet Take 600 mg by mouth 2 (two) times a day with meals.   Yes PROVIDER, HISTORICAL   hydroCHLOROthiazide (HYDRODIURIL) 25 MG tablet TAKE 1 TABLET(25 MG) BY MOUTH DAILY 11/18/17  Yes Delmer Back MD   lisinopril (PRINIVIL,ZESTRIL) 5 MG tablet Take 1 tablet (5 mg total) by mouth daily. 1/4/18  Yes Delmer Back MD   metoprolol succinate (TOPROL-XL) 25 MG TAKE 2 TABLETS BY MOUTH DAILY 8/10/17  Yes Delmer Back MD   EPINEPHrine (EPIPEN) 0.3 mg/0.3 mL atIn Inject 0.3 mL (0.3 mg total) into the shoulder, thigh, or buttocks once. Inject intramuscularly as directed. 4/20/16   Delmer Back MD   amLODIPine (NORVASC) 10 MG tablet TAKE 1 TABLET BY MOUTH DAILY 4/20/18 6/4/18  Delmer Back MD   hydroCHLOROthiazide (HYDRODIURIL) 25 MG tablet TAKE 1 TABLET(25 MG) BY MOUTH DAILY 5/25/18 6/4/18  Delmer Back MD       Allergies:   Allergies   Allergen Reactions     Hornet Venom Anaphylaxis       Immunizations:    Immunization History   Administered Date(s) Administered     DT (pediatric) 12/29/2004     Influenza, inj, historic,unspecified 08/19/2012, 09/07/2015     Influenza, seasonal,quad inj 6-35 mos 08/25/2011     Pneumo Conj 13-V (2010&after) 07/28/2016     Pneumo Polysac 23-V 12/29/2004     Td,adult,historic,unspecified 12/29/2004     Tdap 06/03/2013     ZOSTER, LIVE 02/27/2008       Exam Chest clear to auscultation and percussion.  Heart tones regular rhythm without murmur rub or gallop.  Abdomen soft nontender no organomegaly.  No peritoneal signs.  Extremities free of edema cyanosis or clubbing.  Neck veins nondistended no thyromegaly or scleral icterus noted, carotids full.  Skin warm and dry easily conversant good spirited.  Normal intelligence.  Neurologically intact no gross localizing findings.    Assessment and Plan  Diabetes mellitus type 2 check A1c blood sugar and lipid panel today.    Hypertension and hyperlipidemia with improved control.  126/74.    Dependent edema exacerbated by amlodipine discussed and reassured salt restriction recommended.  No neck vein distention lungs are clear.    Hornet venom allergy.    Lumbago history of lumbar back surgery.  With resultant weakness right lower extremity paresthesias and edema.  Denies PND.    Time: total time spent with the patient was 25 minutes of which >50% was spent in counseling and coordination of care    The following high BMI interventions were performed this visit: encouragement to exercise    Delmer Back MD    Patient Active Problem List   Diagnosis     Hyperglycemia     Essential Hypertension     Lumbago     Sciatica

## 2021-06-19 NOTE — LETTER
Letter by Delmer Back MD at      Author: Delmer Back MD Service: -- Author Type: --    Filed:  Encounter Date: 7/8/2019 Status: (Other)         Tia Barorn  [address]  [place] [address]             July 8, 2019         Dear MsSilvia Barron,    Below are the results from your recent visit:    Resulted Orders   Glycosylated Hemoglobin A1c   Result Value Ref Range    Hemoglobin A1c 6.2 (H) 3.5 - 6.0 %   Glucose   Result Value Ref Range    Glucose 123 70 - 125 mg/dL    Patient Fasting > 8hrs? Yes     Narrative    Fasting Glucose reference range is 70-99 mg/dL per  American Diabetes Association (ADA) guidelines.   Lipid Cascade   Result Value Ref Range    Cholesterol 214 (H) <=199 mg/dL    Triglycerides 290 (H) <=149 mg/dL    HDL Cholesterol 52 >=50 mg/dL    LDL Calculated 104 <=129 mg/dL    Patient Fasting > 8hrs? Yes    Potassium   Result Value Ref Range    Potassium 4.9 3.5 - 5.0 mmol/L   Hemoglobin   Result Value Ref Range    Hemoglobin 15.3 12.0 - 16.0 g/dL   All very good results   Please call with questions or contact us using [employer].    Sincerely,    Electronically signed by Delmer Back MD

## 2021-06-20 ENCOUNTER — HEALTH MAINTENANCE LETTER (OUTPATIENT)
Age: 81
End: 2021-06-20

## 2021-06-20 NOTE — PROGRESS NOTES
Office Visit - Follow up    Tia Barron   78 y.o. female    Date of Visit: 9/10/2018    Chief Complaint   Patient presents with     Hypertension       Subjective: Hypertension not adequately controlled.  Denies headache chest pain shortness of breath no blood in stool or urine medication list reviewed well-tolerated.  Allergies hornet bee sting venom.  No blood in stool or urine medication list reviewed well-tolerated normal effects history of lumbar disc disease with some chronic swelling right lower extremity from weakness numbness.  No symptoms of herniated disc at this juncture mainly osteoarthritis.    Mammogram allCLEAR June 21, 2018.    ROS: A comprehensive review of systems was performed and was otherwise negative    Medications:  Prior to Admission medications    Medication Sig Start Date End Date Taking? Authorizing Provider   amLODIPine (NORVASC) 10 MG tablet Take 1 tablet (10 mg total) by mouth daily. 7/21/18  Yes Delmer Back MD   aspirin 81 MG EC tablet Take 81 mg by mouth daily.   Yes PROVIDER, HISTORICAL   calcium carbonate (CALCIUM 600) 600 mg calcium (1,500 mg) tablet Take 600 mg by mouth 2 (two) times a day with meals.   Yes PROVIDER, HISTORICAL   hydroCHLOROthiazide (HYDRODIURIL) 25 MG tablet TAKE 1 TABLET(25 MG) BY MOUTH DAILY 8/12/18  Yes Delmer Back MD   lisinopril (PRINIVIL,ZESTRIL) 5 MG tablet Take 1 tablet (5 mg total) by mouth 2 (two) times a day. 9/10/18  Yes Delmer Back MD   metoprolol succinate (TOPROL-XL) 25 MG TAKE 2 TABLETS BY MOUTH DAILY 8/13/18  Yes Delmer Back MD   hydroCHLOROthiazide (HYDRODIURIL) 25 MG tablet TAKE 1 TABLET(25 MG) BY MOUTH DAILY 11/18/17 9/10/18 Yes Delmer Back MD   lisinopril (PRINIVIL,ZESTRIL) 5 MG tablet Take 1 tablet (5 mg total) by mouth daily. 1/4/18 9/10/18 Yes Delmer Back MD   EPINEPHrine (EPIPEN) 0.3 mg/0.3 mL atIn Inject 0.3 mL (0.3 mg total) into the shoulder, thigh, or buttocks once. Inject  intramuscularly as directed. 4/20/16   Delmer Back MD   amLODIPine (NORVASC) 10 MG tablet TAKE ONE TABLET BY MOUTH EVERY DAY 7/28/16 9/10/18  Delmer Back MD       Allergies:   Allergies   Allergen Reactions     Hornet Venom Anaphylaxis       Immunizations:   Immunization History   Administered Date(s) Administered     DT (pediatric) 12/29/2004     Influenza, inj, historic,unspecified 08/19/2012, 09/07/2015     Influenza, seasonal,quad inj 6-35 mos 08/25/2011     Pneumo Conj 13-V (2010&after) 07/28/2016     Pneumo Polysac 23-V 12/29/2004     Td,adult,historic,unspecified 12/29/2004     Tdap 06/03/2013     ZOSTER, LIVE 02/27/2008       Exam Chest clear to auscultation and percussion.  Heart tones regular rhythm without murmur rub or gallop.  Abdomen soft nontender no organomegaly.  No peritoneal signs.  Extremities free of edema cyanosis or clubbing.  Neck veins nondistended no thyromegaly or scleral icterus noted, carotids full.  Skin warm and dry easily conversant good spirited.  Normal intelligence.  Neurologically intact no gross localizing findings.    Assessment and Plan  Hypertension not under adequate control increase lisinopril to 10 mg daily other marrow meds and cares same.    Bee sting allergy.    Dependent edema right lower extremity exacerbated by pain therapy 10 mg daily.    Lumbago with history of osteoarthritis and lumbar disc disease requiring surgery for symptoms most consistent with OA at this juncture.  Recommend avoidance of salt and diet regular action weight loss.  Weight up 1 pounds BMI is elevated at 35+ obesity.  Discussed.  Recheck pressure 1 62/72 pulse 70 O2 sats 98% pulse respiratory rate 18 unlabored.    Time: total time spent with the patient was 25 minutes of which >50% was spent in counseling and coordination of care    The following high BMI interventions were performed this visit: encouragement to exercise    Delmer Back MD    Patient Active Problem List    Diagnosis     Hyperglycemia     Essential Hypertension     Lumbago     Sciatica      Topical Clindamycin Counseling: Patient counseled that this medication may cause skin irritation or allergic reactions.  In the event of skin irritation, the patient was advised to reduce the amount of the drug applied or use it less frequently.   The patient verbalized understanding of the proper use and possible adverse effects of clindamycin.  All of the patient's questions and concerns were addressed.

## 2021-06-20 NOTE — LETTER
Letter by Delmer Back MD at      Author: Delmer Back MD Service: -- Author Type: --    Filed:  Encounter Date: 1/22/2020 Status: (Other)         Tia Barron  [address]  [place] [facility] [address]             January 22, 2020         Dear Ms. Anderson,    Below are the results from your recent visit:    Resulted Orders   Glycosylated Hemoglobin A1c   Result Value Ref Range    Hemoglobin A1c 5.9 3.5 - 6.0 %   Glucose   Result Value Ref Range    Glucose 117 70 - 125 mg/dL    Patient Fasting > 8hrs? Yes     Narrative    Fasting Glucose reference range is 70-99 mg/dL per  American Diabetes Association (ADA) guidelines.   Lipid Cascade   Result Value Ref Range    Cholesterol 213 (H) <=199 mg/dL    Triglycerides 185 (H) <=149 mg/dL    HDL Cholesterol 61 >=50 mg/dL    LDL Calculated 115 <=129 mg/dL    Patient Fasting > 8hrs? Yes    Potassium   Result Value Ref Range    Potassium 4.3 3.5 - 5.0 mmol/L        All very good results     Please call with questions or contact us using [employer]    Sincerely,        Electronically signed by Delmer Back MD

## 2021-06-21 NOTE — PROGRESS NOTES
Office Visit - Follow up    Tia Barron   78 y.o. female    Date of Visit: 11/13/2018    Chief Complaint   Patient presents with     Hypertension     Blood pressure check       Subjective: Hypertension.    Labs last done June 4, 2018.  Prior history of hyperlipidemia and hyperglycemia.  No blood in stool or urine no chest pain shortness of breath medication list reviewed well-tolerated.  Weight down 2 pounds.  Denies polyuria or polydipsia.  No symptoms of peripheral neuropathy.  Trying exercise more.    Allergic to hornet venom or bee sting.  Medication list reviewed well-tolerated normal effects.  Mammogram allCLEAR benign June 21, 2018 colonoscopy allCLEAR November 19, 2004.    ROS: A comprehensive review of systems was performed and was otherwise negative    Medications:  Prior to Admission medications    Medication Sig Start Date End Date Taking? Authorizing Provider   amLODIPine (NORVASC) 10 MG tablet Take 1 tablet (10 mg total) by mouth daily. 7/21/18  Yes Delmer Back MD   aspirin 81 MG EC tablet Take 81 mg by mouth daily.   Yes PROVIDER, HISTORICAL   hydroCHLOROthiazide (HYDRODIURIL) 25 MG tablet TAKE 1 TABLET(25 MG) BY MOUTH DAILY 8/12/18  Yes Delmer Back MD   lisinopril (PRINIVIL,ZESTRIL) 5 MG tablet TAKE 1 TABLET(5 MG) BY MOUTH TWICE DAILY 9/10/18  Yes Delmer Back MD   metoprolol succinate (TOPROL-XL) 25 MG TAKE 2 TABLETS BY MOUTH DAILY 11/8/18  Yes Delmer Back MD   calcium carbonate (CALCIUM 600) 600 mg calcium (1,500 mg) tablet Take 600 mg by mouth 2 (two) times a day with meals.    PROVIDER, HISTORICAL   EPINEPHrine (EPIPEN) 0.3 mg/0.3 mL atIn Inject 0.3 mL (0.3 mg total) into the shoulder, thigh, or buttocks once. Inject intramuscularly as directed. 4/20/16   Delmer Back MD       Allergies:   Allergies   Allergen Reactions     Hornet Venom Anaphylaxis       Immunizations:   Immunization History   Administered Date(s) Administered     DT (pediatric) 12/29/2004      Influenza, inj, historic,unspecified 08/19/2012, 09/07/2015     Influenza, seasonal,quad inj 6-35 mos 08/25/2011     Pneumo Conj 13-V (2010&after) 07/28/2016     Pneumo Polysac 23-V 12/29/2004     Td,adult,historic,unspecified 12/29/2004     Tdap 06/03/2013     ZOSTER, LIVE 02/27/2008       Exam Chest clear to auscultation and percussion.  Heart tones regular rhythm without murmur rub or gallop.  Abdomen soft nontender no organomegaly.  No peritoneal signs.  Extremities free of edema cyanosis or clubbing.  Neck veins nondistended no thyromegaly or scleral icterus noted, carotids full.  Skin warm and dry easily conversant good spirited.  Normal intelligence.  Neurologically intact no gross localizing findings.    Assessment and Plan  Hypertension with mild systolic elevation.  142/76 pulse 67 respirations 18 O2 sats 99%.  Continue same meds and cares.  With next office visit we will check potassium level plus A1c blood sugar lipid panel.  Also serum creatinine level will be done.    Upper glycemia without symptoms of diabetes mellitus type 2.  No symptoms of polyuria or polydipsia no symptoms of peripheral neuropathy.    Obesity with probable insulin resistance.  A1c blood sugar lipid panel with next office visit.    Bee sting hornet venom allergy.  EpiPen at hand.  RTC 3 months time fasting.    Time: total time spent with the patient was 25 minutes of which >50% was spent in counseling and coordination of care    The following high BMI interventions were performed this visit: encouragement to exercise    Delmer Back MD    Patient Active Problem List   Diagnosis     Hyperglycemia     Essential Hypertension     Lumbago     Sciatica

## 2021-06-21 NOTE — LETTER
Letter by Delmer Back MD at      Author: Delmer Back MD Service: -- Author Type: --    Filed:  Encounter Date: 11/19/2020 Status: (Other)         Tia Barron  [address]  [place] [facility] [address]             November 19, 2020         Dear Ms. Barron,    Below are the results from your recent visit:    Resulted Orders   Lipid Cascade   Result Value Ref Range    Cholesterol 212 (H) <=199 mg/dL    Triglycerides 224 (H) <=149 mg/dL    HDL Cholesterol 57 >=50 mg/dL    LDL Calculated 110 <=129 mg/dL    Patient Fasting > 8hrs? Unknown    Comprehensive Metabolic Panel   Result Value Ref Range    Sodium 141 136 - 145 mmol/L    Potassium 3.9 3.5 - 5.0 mmol/L    Chloride 102 98 - 107 mmol/L    CO2 26 22 - 31 mmol/L    Anion Gap, Calculation 13 5 - 18 mmol/L    Glucose 135 (H) 70 - 125 mg/dL    BUN 15 8 - 28 mg/dL    Creatinine 0.87 0.60 - 1.10 mg/dL    GFR MDRD Af Amer >60 >60 mL/min/1.73m2    GFR MDRD Non Af Amer >60 >60 mL/min/1.73m2    Bilirubin, Total 1.6 (H) 0.0 - 1.0 mg/dL    Calcium 10.1 8.5 - 10.5 mg/dL    Protein, Total 7.0 6.0 - 8.0 g/dL    Albumin 4.3 3.5 - 5.0 g/dL    Alkaline Phosphatase 75 45 - 120 U/L    AST 16 0 - 40 U/L    ALT 22 0 - 45 U/L    Narrative    Fasting Glucose reference range is 70-99 mg/dL per  American Diabetes Association (ADA) guidelines.   Glycosylated Hemoglobin A1c   Result Value Ref Range    Hemoglobin A1c 6.0 (H) <=5.6 %      Comment:      Normal <5.7% Prediabete 5.7-6.4% Diabletes 6.5% or higher - adopted from ADA consensus guidelines       All very good results    Please call with questions or contact us using [employer].    Sincerely,        Electronically signed by Delmer Back MD

## 2021-06-24 NOTE — PROGRESS NOTES
Office Visit - Follow up    Tia Barron   79 y.o. female    Date of Visit: 3/14/2019    Chief Complaint   Patient presents with     Hypertension       Subjective: Hypertension with history of hyperglycemia plus obesity BMI is 36.    The patient's lisinopril dose was increased to 10 mg daily since that time she is noted a dry cough that punctuate's her speech intermittently it is not disabling for her or to others.  It is a nonproductive cough associated with no blood in the sputum.  She is allergic to hornet venom.    No blood in stool or urine no chest pain shortness of breath medication list is reviewed generally well-tolerated.    ROS: A comprehensive review of systems was performed and was otherwise negative    Medications:  Prior to Admission medications    Medication Sig Start Date End Date Taking? Authorizing Provider   amLODIPine (NORVASC) 10 MG tablet Take 1 tablet (10 mg total) by mouth daily. 7/21/18  Yes Delmer Back MD   aspirin 81 MG EC tablet Take 81 mg by mouth daily.   Yes PROVIDER, HISTORICAL   calcium carbonate (CALCIUM 600) 600 mg calcium (1,500 mg) tablet Take 600 mg by mouth 2 (two) times a day with meals.   Yes PROVIDER, HISTORICAL   hydroCHLOROthiazide (HYDRODIURIL) 25 MG tablet TAKE 1 TABLET(25 MG) BY MOUTH DAILY 2/8/19  Yes Delmer Back MD   lisinopril (PRINIVIL,ZESTRIL) 5 MG tablet TAKE 1 TABLET(5 MG) BY MOUTH TWICE DAILY 9/10/18  Yes Delmer Back MD   metoprolol succinate (TOPROL-XL) 25 MG TAKE 2 TABLETS BY MOUTH DAILY 2/8/19  Yes Delmer Back MD   EPINEPHrine (EPIPEN) 0.3 mg/0.3 mL atIn Inject 0.3 mL (0.3 mg total) into the shoulder, thigh, or buttocks once. Inject intramuscularly as directed. 4/20/16   Delmer Back MD   hydroCHLOROthiazide (HYDRODIURIL) 25 MG tablet TAKE 1 TABLET(25 MG) BY MOUTH DAILY 8/12/18 3/14/19  Delmer Back MD   hydroCHLOROthiazide (HYDRODIURIL) 25 MG tablet TAKE 1 TABLET(25 MG) BY MOUTH DAILY 11/21/18 3/14/19   Delmer Back MD       Allergies:   Allergies   Allergen Reactions     Hornet Venom Anaphylaxis       Immunizations:   Immunization History   Administered Date(s) Administered     DT (pediatric) 12/29/2004     Influenza, inj, historic,unspecified 08/19/2012, 09/07/2015     Influenza, seasonal,quad inj 6-35 mos 08/25/2011     Pneumo Conj 13-V (2010&after) 07/28/2016     Pneumo Polysac 23-V 12/29/2004     Td,adult,historic,unspecified 12/29/2004     Tdap 06/03/2013     ZOSTER, LIVE 02/27/2008       Exam Chest clear to auscultation and percussion.  Heart tones regular rhythm without murmur rub or gallop.  Abdomen soft nontender no organomegaly.  No peritoneal signs.  Extremities free of edema cyanosis or clubbing.  Neck veins nondistended no thyromegaly or scleral icterus noted, carotids full.  Skin warm and dry easily conversant good spirited.  Normal intelligence.  Neurologically intact no gross localizing findings.  No carotid bruits neck veins are nondistended distal pulses dorsalis pedis present both lower extremities and are strong the abdomen showed centripetal obesity BMI is 36.    134/74 pulse 73 respirations 18 O2 sats 97% on room air.    Assessment and Plan  Hypertension with hyperglycemia and history of cough related to ACE inhibitor therapy.    Needs weight loss with caloric restriction regular exercise blood pressure is well controlled at 134/74 same meds and cares.  With next office visit check labs to include lipid panel blood sugar A1c potassium level.    Hornet venom allergy.    Time: total time spent with the patient was 25 minutes of which >50% was spent in counseling and coordination of care    The following high BMI interventions were performed this visit: encouragement to exercise    Delmer Back MD    Patient Active Problem List   Diagnosis     Hyperglycemia     Essential Hypertension     Lumbago     Sciatica

## 2021-07-05 ENCOUNTER — COMMUNICATION - HEALTHEAST (OUTPATIENT)
Dept: INTERNAL MEDICINE | Facility: CLINIC | Age: 81
End: 2021-07-05

## 2021-07-05 DIAGNOSIS — I10 HTN (HYPERTENSION): ICD-10-CM

## 2021-07-05 RX ORDER — AMLODIPINE BESYLATE 10 MG/1
TABLET ORAL
Qty: 90 TABLET | Refills: 2 | Status: SHIPPED | OUTPATIENT
Start: 2021-07-05 | End: 2021-07-22

## 2021-07-05 NOTE — TELEPHONE ENCOUNTER
Telephone Encounter by Jamal Chaidez, RN at 7/5/2021  9:05 AM     Author: Jamal Chaidez, RN Service: -- Author Type: Registered Nurse    Filed: 7/5/2021  9:05 AM Encounter Date: 7/4/2021 Status: Signed    : Jamal Chaidez, RN (Registered Nurse)       Refill Approved    Rx renewed per Medication Renewal Policy. Medication was last renewed on 7/19/20.    Jamal Chaidez, [facility] Connection Triage/Med Refill 7/5/2021     Requested Prescriptions   Pending Prescriptions Disp Refills   ? amLODIPine (NORVASC) 10 MG tablet [Pharmacy Med Name: AMLODIPINE BESYLATE 10MG TABLETS] 90 tablet 3     Sig: TAKE 1 TABLET BY MOUTH EVERY DAY       Calcium-Channel Blockers Protocol Passed - 7/4/2021  1:51 PM        Passed - PCP or prescribing provider visit in past 12 months or next 3 months     Last office visit with prescriber/PCP: 3/18/2021 Delmer Back MD OR same dept: 11/17/2020 Delmer Back MD OR same specialty: 3/18/2021 Delmer Back MD  Last physical: Visit date not found Last MTM visit: Visit date not found   Next visit within 3 mo: Visit date not found  Next physical within 3 mo: Visit date not found  Prescriber OR PCP: Delmer Back MD  Last diagnosis associated with med order: 1. HTN (hypertension)  - amLODIPine (NORVASC) 10 MG tablet [Pharmacy Med Name: AMLODIPINE BESYLATE 10MG TABLETS]; TAKE 1 TABLET BY MOUTH EVERY DAY  Dispense: 90 tablet; Refill: 3    If protocol passes may refill for 12 months if within 3 months of last provider visit (or a total of 15 months).             Passed - Blood pressure filed in past 12 months     BP Readings from Last 1 Encounters:   03/18/21 144/70

## 2021-07-13 ENCOUNTER — RECORDS - HEALTHEAST (OUTPATIENT)
Dept: ADMINISTRATIVE | Facility: CLINIC | Age: 81
End: 2021-07-13

## 2021-07-21 ENCOUNTER — RECORDS - HEALTHEAST (OUTPATIENT)
Dept: ADMINISTRATIVE | Facility: CLINIC | Age: 81
End: 2021-07-21

## 2021-07-22 ENCOUNTER — OFFICE VISIT (OUTPATIENT)
Dept: INTERNAL MEDICINE | Facility: CLINIC | Age: 81
End: 2021-07-22
Payer: MEDICARE

## 2021-07-22 VITALS
WEIGHT: 199 LBS | DIASTOLIC BLOOD PRESSURE: 80 MMHG | HEIGHT: 62 IN | OXYGEN SATURATION: 96 % | BODY MASS INDEX: 36.62 KG/M2 | SYSTOLIC BLOOD PRESSURE: 138 MMHG | HEART RATE: 82 BPM

## 2021-07-22 DIAGNOSIS — I10 ESSENTIAL HYPERTENSION: Primary | ICD-10-CM

## 2021-07-22 DIAGNOSIS — E11.69 TYPE 2 DIABETES MELLITUS WITH OTHER SPECIFIED COMPLICATION, WITHOUT LONG-TERM CURRENT USE OF INSULIN (H): ICD-10-CM

## 2021-07-22 LAB
ANION GAP SERPL CALCULATED.3IONS-SCNC: 15 MMOL/L (ref 5–18)
BUN SERPL-MCNC: 13 MG/DL (ref 8–28)
CALCIUM SERPL-MCNC: 10.2 MG/DL (ref 8.5–10.5)
CHLORIDE BLD-SCNC: 102 MMOL/L (ref 98–107)
CHOLEST SERPL-MCNC: 201 MG/DL
CO2 SERPL-SCNC: 26 MMOL/L (ref 22–31)
CREAT SERPL-MCNC: 0.88 MG/DL (ref 0.6–1.1)
FASTING STATUS PATIENT QL REPORTED: YES
GFR SERPL CREATININE-BSD FRML MDRD: 61 ML/MIN/1.73M2
GLUCOSE BLD-MCNC: 137 MG/DL (ref 70–125)
HBA1C MFR BLD: 6 % (ref 0–5.6)
HDLC SERPL-MCNC: 55 MG/DL
LDLC SERPL CALC-MCNC: 102 MG/DL
POTASSIUM BLD-SCNC: 4.5 MMOL/L (ref 3.5–5)
SODIUM SERPL-SCNC: 143 MMOL/L (ref 136–145)
TRIGL SERPL-MCNC: 220 MG/DL

## 2021-07-22 PROCEDURE — 80048 BASIC METABOLIC PNL TOTAL CA: CPT | Performed by: INTERNAL MEDICINE

## 2021-07-22 PROCEDURE — 99214 OFFICE O/P EST MOD 30 MIN: CPT | Performed by: INTERNAL MEDICINE

## 2021-07-22 PROCEDURE — 80061 LIPID PANEL: CPT | Performed by: INTERNAL MEDICINE

## 2021-07-22 PROCEDURE — 36415 COLL VENOUS BLD VENIPUNCTURE: CPT | Performed by: INTERNAL MEDICINE

## 2021-07-22 PROCEDURE — 83036 HEMOGLOBIN GLYCOSYLATED A1C: CPT | Performed by: INTERNAL MEDICINE

## 2021-07-22 ASSESSMENT — MIFFLIN-ST. JEOR: SCORE: 1310.91

## 2021-07-22 NOTE — LETTER
July 23, 2021      Tia Barron  [address]   [place] [address]        Dear ,    We are writing to inform you of your test results. Looks very good        Resulted Orders   Hemoglobin A1c   Result Value Ref Range    Hemoglobin A1C 6.0 (H) 0.0 - 5.6 %      Comment:      Normal <5.7%   Prediabetes 5.7-6.4%    Diabetes 6.5% or higher     Note: Adopted from ADA consensus guidelines.       If you have any questions or concerns, please call the clinic at the number listed above.       Sincerely,      Delmer Back MD

## 2021-07-22 NOTE — PROGRESS NOTES
"Assessment/Plan:    Hypertension controlled.  138/88 no target organ damage related to same today check basic metabolic profile plus lipid panel.    Diabetes mellitus type 2 check A1c plus lipid panel today.    Low back pain likely osteoarthritic degenerative disc disease.  Discussed core strengthening exercises.    25 minutes spent on the date of the encounter doing chart review, patient visit and documentation     Subjective:  Tia Barron is a 81 year old female presents for the following health issues stable.  Low back pain.  Discussed core strengthening exercises.    ROS:  Denies chest pain shortness of breath no blood in stool or urine medication list reviewed reconciled.  Non-smoker no excess alcohol.  .  One granddaughter physician assistant.    Objective:  /80 (BP Location: Right arm, Patient Position: Sitting)   Pulse 82   Ht 1.575 m (5' 2\")   Wt 90.3 kg (199 lb)   SpO2 96%   BMI 36.40 kg/m    Chest clear to auscultation and percussion.  Heart tones regular rhythm without murmur rub or gallop.  Abdomen soft nontender no organomegaly.  No peritoneal signs.  Extremities free of edema cyanosis or clubbing.  Neck veins nondistended no thyromegaly or scleral icterus noted, carotids full.  Skin warm and dry easily conversant good spirited.  Normal intelligence.  Neurologically intact no gross localizing findings.    "

## 2021-08-04 DIAGNOSIS — Z00.00 ROUTINE GENERAL MEDICAL EXAMINATION AT A HEALTH CARE FACILITY: ICD-10-CM

## 2021-08-06 DIAGNOSIS — Z00.00 ROUTINE GENERAL MEDICAL EXAMINATION AT A HEALTH CARE FACILITY: ICD-10-CM

## 2021-08-08 RX ORDER — METOPROLOL SUCCINATE 25 MG/1
TABLET, EXTENDED RELEASE ORAL
Qty: 180 TABLET | Refills: 3 | Status: SHIPPED | OUTPATIENT
Start: 2021-08-08 | End: 2022-01-04

## 2021-08-08 NOTE — TELEPHONE ENCOUNTER
"Last Written Prescription Date:  8/3/20  Last Fill Quantity: 180,  # refills: 3   Last office visit provider:  7/22/21    Requested Prescriptions   Pending Prescriptions Disp Refills     metoprolol succinate ER (TOPROL-XL) 25 MG 24 hr tablet [Pharmacy Med Name: METOPROLOL ER SUCCINATE 25MG TABS] 180 tablet 3     Sig: TAKE 2 TABLETS(50 MG) BY MOUTH DAILY       Beta-Blockers Protocol Passed - 8/4/2021 10:35 AM        Passed - Blood pressure under 140/90 in past 12 months     BP Readings from Last 3 Encounters:   07/22/21 138/80   03/18/21 (!) 144/70   11/17/20 (!) 148/80                 Passed - Patient is age 6 or older        Passed - Recent (12 mo) or future (30 days) visit within the authorizing provider's specialty     Patient has had an office visit with the authorizing provider or a provider within the authorizing providers department within the previous 12 mos or has a future within next 30 days. See \"Patient Info\" tab in inbasket, or \"Choose Columns\" in Meds & Orders section of the refill encounter.              Passed - Medication is active on med list             Lola Botello RN 08/08/21 8:48 AM  "

## 2021-08-09 RX ORDER — METOPROLOL SUCCINATE 25 MG/1
TABLET, EXTENDED RELEASE ORAL
Qty: 180 TABLET | Refills: 3 | OUTPATIENT
Start: 2021-08-09

## 2021-10-03 DIAGNOSIS — I10 ESSENTIAL HYPERTENSION: ICD-10-CM

## 2021-10-03 RX ORDER — LOSARTAN POTASSIUM 50 MG/1
TABLET ORAL
Qty: 90 TABLET | Refills: 3 | Status: SHIPPED | OUTPATIENT
Start: 2021-10-03 | End: 2022-01-04

## 2021-10-04 NOTE — TELEPHONE ENCOUNTER
"Last Written Prescription Date:  10/12/20  Last Fill Quantity: 90,  # refills: 3   Last office visit provider:  7/22/21     Requested Prescriptions   Pending Prescriptions Disp Refills     losartan (COZAAR) 50 MG tablet [Pharmacy Med Name: LOSARTAN 50MG TABLETS] 90 tablet 3     Sig: TAKE 1 TABLET(50 MG) BY MOUTH DAILY       Angiotensin-II Receptors Passed - 10/3/2021 10:58 AM        Passed - Last blood pressure under 140/90 in past 12 months     BP Readings from Last 3 Encounters:   07/22/21 138/80   03/18/21 (!) 144/70   11/17/20 (!) 148/80                 Passed - Recent (12 mo) or future (30 days) visit within the authorizing provider's specialty     Patient has had an office visit with the authorizing provider or a provider within the authorizing providers department within the previous 12 mos or has a future within next 30 days. See \"Patient Info\" tab in inbasket, or \"Choose Columns\" in Meds & Orders section of the refill encounter.              Passed - Medication is active on med list        Passed - Patient is age 18 or older        Passed - No active pregnancy on record        Passed - Normal serum creatinine on file in past 12 months     Recent Labs   Lab Test 07/22/21  0926   CR 0.88       Ok to refill medication if creatinine is low          Passed - Normal serum potassium on file in past 12 months     Recent Labs   Lab Test 07/22/21  0926   POTASSIUM 4.5                    Passed - No positive pregnancy test in past 12 months             Carol Mosley RN 10/03/21 7:49 PM  "

## 2021-10-11 ENCOUNTER — HEALTH MAINTENANCE LETTER (OUTPATIENT)
Age: 81
End: 2021-10-11

## 2022-01-04 ENCOUNTER — OFFICE VISIT (OUTPATIENT)
Dept: INTERNAL MEDICINE | Facility: CLINIC | Age: 82
End: 2022-01-04
Payer: MEDICARE

## 2022-01-04 VITALS
WEIGHT: 197 LBS | SYSTOLIC BLOOD PRESSURE: 136 MMHG | HEART RATE: 84 BPM | BODY MASS INDEX: 36.25 KG/M2 | DIASTOLIC BLOOD PRESSURE: 74 MMHG | OXYGEN SATURATION: 95 % | HEIGHT: 62 IN

## 2022-01-04 DIAGNOSIS — I10 ESSENTIAL HYPERTENSION: ICD-10-CM

## 2022-01-04 PROCEDURE — 99214 OFFICE O/P EST MOD 30 MIN: CPT | Performed by: INTERNAL MEDICINE

## 2022-01-04 RX ORDER — AMLODIPINE BESYLATE 10 MG/1
10 TABLET ORAL DAILY
Qty: 90 TABLET | Refills: 3 | Status: SHIPPED | OUTPATIENT
Start: 2022-01-04 | End: 2022-09-22

## 2022-01-04 RX ORDER — LOSARTAN POTASSIUM 50 MG/1
50 TABLET ORAL DAILY
Qty: 90 TABLET | Refills: 3 | Status: SHIPPED | OUTPATIENT
Start: 2022-01-04 | End: 2022-09-22

## 2022-01-04 RX ORDER — METOPROLOL SUCCINATE 25 MG/1
TABLET, EXTENDED RELEASE ORAL
Qty: 180 TABLET | Refills: 3 | Status: SHIPPED | OUTPATIENT
Start: 2022-01-04 | End: 2022-09-22

## 2022-01-04 RX ORDER — HYDROCHLOROTHIAZIDE 25 MG/1
25 TABLET ORAL DAILY
Qty: 90 TABLET | Refills: 3 | Status: SHIPPED | OUTPATIENT
Start: 2022-01-04 | End: 2022-09-22

## 2022-01-04 ASSESSMENT — MIFFLIN-ST. JEOR: SCORE: 1301.84

## 2022-01-04 NOTE — PROGRESS NOTES
"Assessment/Plan:    Hypertension controlled 136/74.    Obesity BMI 36.  Needs weight loss with caloric restriction regular exercise.    Low back pain increase core strengthening exercises.  Walking.    Continue aspirin recommended low-dose 81 mg daily because of comorbidity hypertension discussed.    25 minutes spent on the date of the encounter doing chart review, patient visit and documentation     Subjective:  Tia Barron is a 81 year old female presents for the following health issues history as above.  Using arthritis strength Tylenol for low back pain.  Strength  to be increased by walking to increase core muscles.    ROS:  No blood in stool or urine some low back pain.  Medication list reviewed reconciled.    Objective:  /74   Pulse 84   Ht 1.575 m (5' 2\")   Wt 89.4 kg (197 lb)   SpO2 95%   BMI 36.03 kg/m    Chest clear to auscultation and percussion.  Heart tones regular rhythm without murmur rub or gallop.  Abdomen soft nontender no organomegaly.  No peritoneal signs.  Extremities free of edema cyanosis or clubbing.  Neck veins nondistended no thyromegaly or scleral icterus noted, carotids full.  Skin warm and dry easily conversant good spirited.  Normal intelligence.  Neurologically intact no gross localizing findings.    "

## 2022-01-30 ENCOUNTER — HEALTH MAINTENANCE LETTER (OUTPATIENT)
Age: 82
End: 2022-01-30

## 2022-05-05 ENCOUNTER — OFFICE VISIT (OUTPATIENT)
Dept: INTERNAL MEDICINE | Facility: CLINIC | Age: 82
End: 2022-05-05
Payer: MEDICARE

## 2022-05-05 VITALS
OXYGEN SATURATION: 97 % | SYSTOLIC BLOOD PRESSURE: 134 MMHG | BODY MASS INDEX: 36.25 KG/M2 | WEIGHT: 197 LBS | HEIGHT: 62 IN | TEMPERATURE: 97.9 F | HEART RATE: 80 BPM | DIASTOLIC BLOOD PRESSURE: 80 MMHG

## 2022-05-05 DIAGNOSIS — I10 ESSENTIAL HYPERTENSION: Primary | ICD-10-CM

## 2022-05-05 PROCEDURE — 99213 OFFICE O/P EST LOW 20 MIN: CPT | Performed by: INTERNAL MEDICINE

## 2022-07-07 ENCOUNTER — TELEPHONE (OUTPATIENT)
Dept: INTERNAL MEDICINE | Facility: CLINIC | Age: 82
End: 2022-07-07

## 2022-07-17 ENCOUNTER — HEALTH MAINTENANCE LETTER (OUTPATIENT)
Age: 82
End: 2022-07-17

## 2022-09-22 ENCOUNTER — OFFICE VISIT (OUTPATIENT)
Dept: INTERNAL MEDICINE | Facility: CLINIC | Age: 82
End: 2022-09-22
Payer: MEDICARE

## 2022-09-22 VITALS
BODY MASS INDEX: 37.15 KG/M2 | WEIGHT: 203.1 LBS | HEART RATE: 102 BPM | DIASTOLIC BLOOD PRESSURE: 83 MMHG | SYSTOLIC BLOOD PRESSURE: 147 MMHG | RESPIRATION RATE: 16 BRPM | OXYGEN SATURATION: 98 %

## 2022-09-22 DIAGNOSIS — I10 ESSENTIAL HYPERTENSION: Primary | ICD-10-CM

## 2022-09-22 PROCEDURE — 99213 OFFICE O/P EST LOW 20 MIN: CPT | Performed by: INTERNAL MEDICINE

## 2022-09-22 RX ORDER — LOSARTAN POTASSIUM 50 MG/1
50 TABLET ORAL DAILY
Qty: 90 TABLET | Refills: 3 | Status: SHIPPED | OUTPATIENT
Start: 2022-09-22 | End: 2023-02-02

## 2022-09-22 RX ORDER — HYDROCHLOROTHIAZIDE 25 MG/1
25 TABLET ORAL DAILY
Qty: 90 TABLET | Refills: 3 | Status: SHIPPED | OUTPATIENT
Start: 2022-09-22 | End: 2023-02-02

## 2022-09-22 RX ORDER — METOPROLOL SUCCINATE 25 MG/1
TABLET, EXTENDED RELEASE ORAL
Qty: 180 TABLET | Refills: 3 | Status: SHIPPED | OUTPATIENT
Start: 2022-09-22 | End: 2023-02-02

## 2022-09-22 RX ORDER — AMLODIPINE BESYLATE 10 MG/1
10 TABLET ORAL DAILY
Qty: 90 TABLET | Refills: 3 | Status: SHIPPED | OUTPATIENT
Start: 2022-09-22 | End: 2023-02-02

## 2022-09-22 ASSESSMENT — PAIN SCALES - GENERAL: PAINLEVEL: NO PAIN (0)

## 2022-09-22 NOTE — PROGRESS NOTES
Assessment/Plan:    Hypertension controlled 147/83 with mild systolic elevation.  May be whitecoat hypertension.  Not resting.    Diabetes mellitus type 2 offered laboratory testing today the patient declined.  With next office visit fasting we will check A1c lipid panel plus blood sugar.    Obesity with insulin resistance BMI slightly elevated 37+ discussed the importance of caloric restriction regular exercise and the need to lose weight.    15 minutes spent on the date of the encounter doing chart review, patient visit and documentation     Subjective:  Tia Barron is a 82 year old female presents for the following health issues generally feels well.  Offers no new complaints tolerates meds well.    ROS:  Denies blood in stool or urine no chest pain or shortness of breath.  Medication list reviewed reconciled in the chart.    Objective:  BP (!) 147/83 (BP Location: Right arm, Patient Position: Sitting, Cuff Size: Adult Regular)   Pulse 102   Resp 16   Wt 92.1 kg (203 lb 1.6 oz)   SpO2 98%   BMI 37.15 kg/m    Neatly attired easily conversant good spirited intelligent.    Back straight no severe spine tenderness chest clear no rales rhonchi or wheezes no carotid bruits thyromegaly no neck vein distention heart tones normal rhythm without murmur rub or gallop abdomen obese no organomegaly masses tenderness no spleen tip palpable no liver tip palpable bowel sounds are present but hypoactive extremities are free of edema did require some assistance going from chair to exam table and vice versa.  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.    Delmer Back MD  Internal Medicine    Answers for HPI/ROS submitted by the patient on 2022  Do you check your blood pressure regularly outside of the clinic?: No  Are your blood pressures ever more than 140 on the top number (systolic) OR more than 90 on the bottom number (diastolic)? (For example, greater than  140/90): No  Are you following a low salt diet?: Yes

## 2022-09-25 ENCOUNTER — HEALTH MAINTENANCE LETTER (OUTPATIENT)
Age: 82
End: 2022-09-25

## 2022-12-12 ENCOUNTER — TELEPHONE (OUTPATIENT)
Dept: INTERNAL MEDICINE | Facility: CLINIC | Age: 82
End: 2022-12-12

## 2023-02-02 ENCOUNTER — OFFICE VISIT (OUTPATIENT)
Dept: INTERNAL MEDICINE | Facility: CLINIC | Age: 83
End: 2023-02-02
Payer: MEDICARE

## 2023-02-02 VITALS
SYSTOLIC BLOOD PRESSURE: 148 MMHG | HEIGHT: 62 IN | BODY MASS INDEX: 37.67 KG/M2 | WEIGHT: 204.7 LBS | DIASTOLIC BLOOD PRESSURE: 68 MMHG | OXYGEN SATURATION: 97 % | HEART RATE: 78 BPM

## 2023-02-02 DIAGNOSIS — E11.8 TYPE 2 DIABETES MELLITUS WITH COMPLICATION, WITHOUT LONG-TERM CURRENT USE OF INSULIN (H): Primary | ICD-10-CM

## 2023-02-02 LAB
CHOLEST SERPL-MCNC: 230 MG/DL
FASTING STATUS PATIENT QL REPORTED: ABNORMAL
GLUCOSE SERPL-MCNC: 163 MG/DL (ref 70–99)
HBA1C MFR BLD: 6.3 % (ref 0–5.6)
HDLC SERPL-MCNC: 60 MG/DL
LDLC SERPL CALC-MCNC: 107 MG/DL
NONHDLC SERPL-MCNC: 170 MG/DL
TRIGL SERPL-MCNC: 315 MG/DL

## 2023-02-02 PROCEDURE — 99214 OFFICE O/P EST MOD 30 MIN: CPT | Performed by: INTERNAL MEDICINE

## 2023-02-02 PROCEDURE — 82947 ASSAY GLUCOSE BLOOD QUANT: CPT | Performed by: INTERNAL MEDICINE

## 2023-02-02 PROCEDURE — 80061 LIPID PANEL: CPT | Performed by: INTERNAL MEDICINE

## 2023-02-02 PROCEDURE — 83036 HEMOGLOBIN GLYCOSYLATED A1C: CPT | Performed by: INTERNAL MEDICINE

## 2023-02-02 PROCEDURE — 36415 COLL VENOUS BLD VENIPUNCTURE: CPT | Performed by: INTERNAL MEDICINE

## 2023-02-02 RX ORDER — AMLODIPINE BESYLATE 10 MG/1
10 TABLET ORAL DAILY
Qty: 90 TABLET | Refills: 3 | Status: SHIPPED | OUTPATIENT
Start: 2023-02-02 | End: 2023-11-30

## 2023-02-02 RX ORDER — LOSARTAN POTASSIUM 50 MG/1
50 TABLET ORAL DAILY
Qty: 90 TABLET | Refills: 3 | Status: SHIPPED | OUTPATIENT
Start: 2023-02-02 | End: 2023-11-30

## 2023-02-02 RX ORDER — METOPROLOL SUCCINATE 25 MG/1
TABLET, EXTENDED RELEASE ORAL
Qty: 180 TABLET | Refills: 3 | Status: SHIPPED | OUTPATIENT
Start: 2023-02-02 | End: 2023-11-30

## 2023-02-02 RX ORDER — HYDROCHLOROTHIAZIDE 25 MG/1
25 TABLET ORAL DAILY
Qty: 90 TABLET | Refills: 3 | Status: SHIPPED | OUTPATIENT
Start: 2023-02-02 | End: 2023-11-30

## 2023-02-02 ASSESSMENT — PAIN SCALES - GENERAL: PAINLEVEL: NO PAIN (0)

## 2023-02-02 NOTE — PROGRESS NOTES
"Assessment/Plan:    Diabetes mellitus type 2 borderline check A1c blood sugar lipid panel and urine for microalbumin today.    Hypertension controlled with mild systolic elevation 148/68.    Obesity BMI elevated at 37+ discussed importance of weight loss with caloric restriction regular exercise decrease insulin resistance.    Macular degeneration no longer driving but stable.  Followed by ophthalmology.    25 minutes spent on the date of the encounter doing chart review, patient visit and documentation     Subjective:  Tia Barron is a 82 year old female presents for the following health issues history as above.  No longer driving.   degeneration.    ROS:  No blood in stool or urine no chest pain shortness of breath.  Med list reviewed reconciled.    Objective:  BP (!) 148/68 (BP Location: Right arm, Patient Position: Sitting, Cuff Size: Adult Large)   Pulse 78   Ht 1.575 m (5' 2\")   Wt 92.9 kg (204 lb 11.2 oz)   SpO2 97%   BMI 37.44 kg/m    Chest clear back straight heart tones normal neck veins nondistended no carotid bruits no thyromegaly abdomen obese nontender no liver spleen tip palpable no masses.  Bowel sounds present hypoactive extremities free of edema cyanosis or clubbing.    Delmer Back MD  Internal Medicine    Answers for HPI/ROS submitted by the patient on 1/28/2023  Do you check your blood pressure regularly outside of the clinic?: No  Are your blood pressures ever more than 140 on the top number (systolic) OR more than 90 on the bottom number (diastolic)? (For example, greater than 140/90): Yes  Are you following a low salt diet?: Yes      "

## 2023-02-03 ENCOUNTER — TELEPHONE (OUTPATIENT)
Dept: INTERNAL MEDICINE | Facility: CLINIC | Age: 83
End: 2023-02-03
Payer: MEDICARE

## 2023-02-03 NOTE — TELEPHONE ENCOUNTER
"Relayed results below to patient.     Patient states she is a bit reluctant to start a new medication stating \"I'm almost 83 and the end is in sight\" but states she would be agreeable to start if PCP thinks this would be best.     Patient also wanting to ask if PCP thinks it would be ok to wait until her appt in June to follow up on this.     Routing to PCP to review.   "

## 2023-02-03 NOTE — TELEPHONE ENCOUNTER
----- Message from Delmer Back MD sent at 2/2/2023  3:46 PM CST -----  Lipids are quite elevated and would recommend rosuvastatin 10 mg tablets dispense number 90 tablets take 1 tablet daily with 5 refills please call patient and her pharmacy.  All other labs are quite good.  Please inform patient.

## 2023-06-01 NOTE — PROGRESS NOTES
Office Visit - Follow up    Tia Barron   80 y.o. female    Date of Visit: 11/17/2020    Chief Complaint   Patient presents with     Hypertension       Subjective: Hypertension.    Fasting for today's visit.    Offers no new complaints.    No chest pain or shortness of breath no blood in stool or urine some leg edema but on amlodipine.  HCTZ seems to help.  Medication list reviewed reconciled in the chart non-smoker no excess alcohol.  Anxious about where were going to be practicing [employer] with plans etc.  Discussed and reassured.    ROS: A comprehensive review of systems was performed and was otherwise negative    Medications:  Prior to Admission medications    Medication Sig Start Date End Date Taking? Authorizing Provider   amLODIPine (NORVASC) 10 MG tablet Take 1 tablet (10 mg total) by mouth daily. 7/19/20  Yes Delmer Back MD   aspirin 81 MG EC tablet Take 81 mg by mouth daily.   Yes PROVIDER, HISTORICAL   hydroCHLOROthiazide (HYDRODIURIL) 25 MG tablet TAKE 1 TABLET BY MOUTH DAILY 5/12/20  Yes Delmer Back MD   losartan (COZAAR) 50 MG tablet Take 1 tablet (50 mg total) by mouth daily. 10/12/20  Yes Delmer Back MD   metoprolol succinate (TOPROL-XL) 25 MG TAKE 2 TABLETS(50 MG) BY MOUTH DAILY 8/3/20  Yes Delmer Back MD       Allergies:   Allergies   Allergen Reactions     Hornet Venom Anaphylaxis       Immunizations:   Immunization History   Administered Date(s) Administered     DT (pediatric) 12/29/2004     Influenza high dose,seasonal,PF, 65+ yrs 09/06/2015, 09/11/2016, 09/19/2017, 10/01/2018     Influenza, inj, historic,unspecified 08/19/2012, 09/07/2015     Influenza, seasonal,quad inj 6-35 mos 08/25/2011     Pneumo Conj 13-V (2010&after) 07/28/2016     Pneumo Polysac 23-V 12/29/2004     Td,adult,historic,unspecified 12/29/2004     Tdap 06/03/2013     ZOSTER, LIVE 02/27/2008       Exam Chest clear to auscultation and percussion.  Heart tones regular rhythm without murmur  rub or gallop.  Abdomen soft nontender no organomegaly.  No peritoneal signs.  Extremities free of edema cyanosis or clubbing.  Neck veins nondistended no thyromegaly or scleral icterus noted, carotids full.  Skin warm and dry easily conversant good spirited.  Normal intelligence.  Neurologically intact no gross localizing findings.  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    Assessment and Plan  Hypertension with obesity and dependent edema check today comprehensive metabolic profile lipid panel.    Diabetes mellitus type 2 check A1c as well.    Time: total time spent with the patient was 25 minutes of which >50% was spent in counseling and coordination of care    The following high BMI interventions were performed this visit: encouragement to exercise    Demler Back MD    Patient Active Problem List   Diagnosis     Hyperglycemia     Essential Hypertension     Lumbago     Sciatica     Obesity (BMI 35.0-39.9) with comorbidity (H)     Type 2 diabetes mellitus with complication, without long-term current use of insulin (H)      For information on Fall & Injury Prevention, visit: [email or web address] OR  [email or web address] OR  https://www.cdc.gov/steadi/patient.html

## 2023-06-15 ENCOUNTER — OFFICE VISIT (OUTPATIENT)
Dept: INTERNAL MEDICINE | Facility: CLINIC | Age: 83
End: 2023-06-15
Payer: MEDICARE

## 2023-06-15 VITALS
TEMPERATURE: 98.2 F | HEIGHT: 62 IN | BODY MASS INDEX: 37.47 KG/M2 | HEART RATE: 103 BPM | WEIGHT: 203.6 LBS | DIASTOLIC BLOOD PRESSURE: 78 MMHG | OXYGEN SATURATION: 98 % | RESPIRATION RATE: 16 BRPM | SYSTOLIC BLOOD PRESSURE: 148 MMHG

## 2023-06-15 DIAGNOSIS — E66.01 MORBID OBESITY (H): ICD-10-CM

## 2023-06-15 DIAGNOSIS — E11.8 TYPE 2 DIABETES MELLITUS WITH COMPLICATION, WITHOUT LONG-TERM CURRENT USE OF INSULIN (H): Primary | ICD-10-CM

## 2023-06-15 LAB
CHOLEST SERPL-MCNC: 196 MG/DL
FASTING STATUS PATIENT QL REPORTED: ABNORMAL
GLUCOSE SERPL-MCNC: 174 MG/DL (ref 70–99)
HBA1C MFR BLD: 6.3 % (ref 0–5.6)
HDLC SERPL-MCNC: 57 MG/DL
LDLC SERPL CALC-MCNC: 96 MG/DL
NONHDLC SERPL-MCNC: 139 MG/DL
TRIGL SERPL-MCNC: 215 MG/DL

## 2023-06-15 PROCEDURE — 36415 COLL VENOUS BLD VENIPUNCTURE: CPT | Performed by: INTERNAL MEDICINE

## 2023-06-15 PROCEDURE — 99214 OFFICE O/P EST MOD 30 MIN: CPT | Performed by: INTERNAL MEDICINE

## 2023-06-15 PROCEDURE — 83036 HEMOGLOBIN GLYCOSYLATED A1C: CPT | Performed by: INTERNAL MEDICINE

## 2023-06-15 PROCEDURE — 80061 LIPID PANEL: CPT | Performed by: INTERNAL MEDICINE

## 2023-06-15 PROCEDURE — 82947 ASSAY GLUCOSE BLOOD QUANT: CPT | Performed by: INTERNAL MEDICINE

## 2023-06-15 ASSESSMENT — PAIN SCALES - GENERAL: PAINLEVEL: MODERATE PAIN (4)

## 2023-06-15 NOTE — PROGRESS NOTES
"  {PROVIDER CHARTING PREFERENCE:599211}    Solange Ferreira is a 83 year old, presenting for the following health issues:  Follow Up (Follow up visit to Hypertension)        6/15/2023     9:05 AM   Additional Questions   Roomed by Sukumar BERNARDO MA   Accompanied by LINDSAY     History of Present Illness       Hypertension: She presents for follow up of hypertension.  She does not check blood pressure  regularly outside of the clinic. Outside blood pressures have been over 140/90. She follows a low salt diet.     She eats 4 or more servings of fruits and vegetables daily.She consumes 0 sweetened beverage(s) daily.She exercises with enough effort to increase her heart rate 10 to 19 minutes per day.  She exercises with enough effort to increase her heart rate 4 days per week.   She is taking medications regularly.       {SUPERLIST (Optional):595042}  {additonal problems for provider to add (Optional):906341}      Review of Systems   {ROS COMP (Optional):438276}      Objective    BP (!) 148/78 (BP Location: Right arm, Patient Position: Sitting, Cuff Size: Adult Large)   Pulse 103   Temp 98.2  F (36.8  C) (Oral)   Resp 16   Ht 1.575 m (5' 2\")   Wt 92.4 kg (203 lb 9.6 oz)   LMP  (LMP Unknown)   SpO2 98%   BMI 37.24 kg/m    Body mass index is 37.24 kg/m .  Physical Exam   {Exam List (Optional):226958}    {Diagnostic Test Results (Optional):796265}    {AMBULATORY ATTESTATION (Optional):205458}            "

## 2023-06-15 NOTE — PROGRESS NOTES
"Assessment/Plan:    Diabetes mellitus type 2 we will check A1c blood sugar lipid panel today.    Obesity BMI 37+ discussed the importance of weight loss with caloric restriction regular exercise.  Insulin resistance.  Dependent upon obesity.    Hypertension controlled stable.  For age 148/78.  83 years.    Hyperlipidemia on statin therapy patient consents to using a statin medication if lipids are off.  We had a good discussion.    25 minutes spent on the date of the encounter doing chart review, patient visit and documentation     Subjective:  Tia Barron is a 83 year old female presents for the following health issues offers no new complaints generally feels well.    ROS:  Dryness of skin and edema discussed the importance of salt restriction and diet med list reviewed reconciled in the chart.    Objective:  BP (!) 148/78 (BP Location: Right arm, Patient Position: Sitting, Cuff Size: Adult Large)   Pulse 103   Temp 98.2  F (36.8  C) (Oral)   Resp 16   Ht 1.575 m (5' 2\")   Wt 92.4 kg (203 lb 9.6 oz)   LMP  (LMP Unknown)   SpO2 98%   BMI 37.24 kg/m    Chest clear heart tones normal abdomen benign extremities trace edema with dry skin noted neck veins nondistended no thyromegaly no carotid bruits neatly attired easily conversant intelligent.  Good spirited.    Delmer Back MD  Internal Medicine    Answers for HPI/ROS submitted by the patient on 6/9/2023  Do you check your blood pressure regularly outside of the clinic?: No  Are your blood pressures ever more than 140 on the top number (systolic) OR more than 90 on the bottom number (diastolic)? (For example, greater than 140/90): Yes  Are you following a low salt diet?: Yes  How many servings of fruits and vegetables do you eat daily?: 4 or more  On average, how many sweetened beverages do you drink each day (Examples: soda, juice, sweet tea, etc.  Do NOT count diet or artificially sweetened beverages)?: 0  How many minutes a day do you exercise " enough to make your heart beat faster?: 10 to 19  How many days a week do you exercise enough to make your heart beat faster?: 4  How many days per week do you miss taking your medication?: 0

## 2023-06-16 DIAGNOSIS — T63.481A ANAPHYLAXIS DUE TO INSECT VENOM: Primary | ICD-10-CM

## 2023-06-16 DIAGNOSIS — T78.2XXA ANAPHYLAXIS DUE TO INSECT VENOM: Primary | ICD-10-CM

## 2023-06-16 NOTE — TELEPHONE ENCOUNTER
Patient calling to get a medication refill on medications attached      Patient requesting a Ephedrine pen

## 2023-06-17 RX ORDER — EPINEPHRINE 0.3 MG/.3ML
0.3 INJECTION SUBCUTANEOUS PRN
Qty: 2 EACH | Refills: 4 | Status: SHIPPED | OUTPATIENT
Start: 2023-06-17 | End: 2024-06-18

## 2023-06-18 ENCOUNTER — MYC MEDICAL ADVICE (OUTPATIENT)
Dept: INTERNAL MEDICINE | Facility: CLINIC | Age: 83
End: 2023-06-18
Payer: MEDICARE

## 2023-08-05 ENCOUNTER — HEALTH MAINTENANCE LETTER (OUTPATIENT)
Age: 83
End: 2023-08-05

## 2023-09-12 ENCOUNTER — TELEPHONE (OUTPATIENT)
Dept: INTERNAL MEDICINE | Facility: CLINIC | Age: 83
End: 2023-09-12
Payer: MEDICARE

## 2023-09-12 ENCOUNTER — MYC MEDICAL ADVICE (OUTPATIENT)
Dept: INTERNAL MEDICINE | Facility: CLINIC | Age: 83
End: 2023-09-12
Payer: MEDICARE

## 2023-09-12 DIAGNOSIS — M62.81 GENERALIZED MUSCLE WEAKNESS: ICD-10-CM

## 2023-09-12 DIAGNOSIS — M54.42 ACUTE BILATERAL LOW BACK PAIN WITH LEFT-SIDED SCIATICA: Primary | ICD-10-CM

## 2023-09-12 DIAGNOSIS — R26.81 GAIT INSTABILITY: Primary | ICD-10-CM

## 2023-09-12 RX ORDER — GABAPENTIN 100 MG/1
100 CAPSULE ORAL 3 TIMES DAILY
Qty: 30 CAPSULE | Refills: 1 | Status: SHIPPED | OUTPATIENT
Start: 2023-09-12 | End: 2024-05-09

## 2023-09-12 NOTE — TELEPHONE ENCOUNTER
Seeking update on 4WW requested this morning.    Relayed that request has been sent to Care Team and is being worked on.

## 2023-09-12 NOTE — TELEPHONE ENCOUNTER
sciatica pain from hip to knee  seen by Kota    Patient was put on muscle relaxin, helped a bit but having more pain    requesting pain medication     seeing spine specialist 9/25    needs before that     Georgina Coronado (Other)  [phone] (Home Phone)

## 2023-09-12 NOTE — TELEPHONE ENCOUNTER
Order/Referral Request    Who is requesting: Daughter, Georgina    Orders being requested: 4 Wheel Walker with bench seat and brakes    Reason service is needed/diagnosis: Patient is weak and uses a cane.  Family does not feel this is safe for patient to continue to use.    When are orders needed by: at earliest convenience    Has this been discussed with Provider: unknown to Writer    Does patient have a preference on a Group/Provider/Facility? [facility]    Does patient have an appointment scheduled?: No    Where to send orders: Place orders within Epic     no chills/no fever/no loss of consciousness/no nausea/no numbness/no syncope/no vomiting

## 2023-09-12 NOTE — TELEPHONE ENCOUNTER
Daughter Georgina calling - listed on Consent to Communicate.    Seeking update on earlier call and request 4WW.    Separate encounter created for 4WW.  Relayed Dr. Back's message.    States she will call back to schedule phone appointment as she is at another appointment and can not schedule during call.     Advised caller RX is being worked on by Care Team

## 2023-09-25 ENCOUNTER — TRANSFERRED RECORDS (OUTPATIENT)
Dept: HEALTH INFORMATION MANAGEMENT | Facility: CLINIC | Age: 83
End: 2023-09-25
Payer: MEDICARE

## 2023-10-06 ENCOUNTER — TRANSFERRED RECORDS (OUTPATIENT)
Dept: HEALTH INFORMATION MANAGEMENT | Facility: CLINIC | Age: 83
End: 2023-10-06
Payer: MEDICARE

## 2023-10-10 ENCOUNTER — VIRTUAL VISIT (OUTPATIENT)
Dept: INTERNAL MEDICINE | Facility: CLINIC | Age: 83
End: 2023-10-10
Payer: MEDICARE

## 2023-10-10 DIAGNOSIS — M51.26 HERNIATED LUMBAR DISC WITHOUT MYELOPATHY: Primary | ICD-10-CM

## 2023-10-10 PROCEDURE — 99442 PR PHYSICIAN TELEPHONE EVALUATION 11-20 MIN: CPT | Mod: 95 | Performed by: INTERNAL MEDICINE

## 2023-10-10 NOTE — PROGRESS NOTES
Tia Barron is a 83 year oldwho is being evaluated via a billable telephone visit.       What phone number would you like to be contacted at? [phone]      Assessment & Plan  Needed lumbar disc with epidural spinal injection cortisone plan for tomorrow.  Pain is severe at times gabapentin has helped as prescribed by this examiner.  Spoke with patient's daughter as the patient's mother is hard of hearing and could not come to the phone at this juncture.  11 minutes spent on the date of the encounter doing chart review, patient visit and documentation I spoke with the patient's family member daughter Georgina for at least 5 minutes.       Subjective  Symptoms of severe low back pain helped with gabapentin helped by sitting but worse with standing or any walking.  Epidural spinal injection is planned for tomorrow.  With cortisone.  I endorsed this procedure and reassurance was given to the patient's daughter who transmitted to the patient herself who could not come to the phone.  Hard of hearing.     Review of Systems   No blood in stool or urine denies chest pain shortness of breath medication list reviewed reconciled in the chart.       Delmer Lopez submitted by the patient for this visit:  Back Pain Visit Questionnaire (Submitted on 10/7/2023)  Your back pain is: new  New Back Pain Visit Questionnaire  (Submitted on 10/7/2023)  What do you think is the original cause of your back pain?: not sure  When did you first notice your back pain? : more than 1 month ago  How would you describe your back pain? : sharp  How often do you feel your back pain? : constantly  Where is your back pain located? : left buttock, left hip  Where does your back pain spread? : left buttocks, left thigh, left knee  Since you noticed your back pain, how has it changed? : always present, but gets better and worse  Does your back pain interfere with your job?: No  On a scale of 1-10 (10 being the worst), how strong is your  back pain?: 7  What makes your back pain worse? : standing  Acupuncture:: not tried  Acetaminophen: helpful  Activity or Exercise: not tried  Chiropractor: not tried  Cold: not tried  Heat: not tried  Massage: not tried  Muscle relaxants : helpful  NSAIDS (Ibuprofen, Naproxen) : not tried  Opioids: helpful  Physical Therapy: not tried  Rest: helpful  Stretching : not tried  Surgery: not tried  TENS Unit: not tried  Topical pain relievers : not tried  Do you see any other healthcare providers for your back pain? : Pain specialist  General Questionnaire (Submitted on 10/7/2023)  Chief Complaint: Chronic problems general questions HPI Form  How many servings of fruits and vegetables do you eat daily?: 2-3  On average, how many sweetened beverages do you drink each day (Examples: soda, juice, sweet tea, etc.  Do NOT count diet or artificially sweetened beverages)?: 1  How many minutes a day do you exercise enough to make your heart beat faster?: 10 to 19  How many days a week do you exercise enough to make your heart beat faster?: 3 or less  How many days per week do you miss taking your medication?: 0

## 2023-10-11 ENCOUNTER — TRANSFERRED RECORDS (OUTPATIENT)
Dept: HEALTH INFORMATION MANAGEMENT | Facility: CLINIC | Age: 83
End: 2023-10-11
Payer: MEDICARE

## 2023-10-30 ENCOUNTER — TRANSFERRED RECORDS (OUTPATIENT)
Dept: HEALTH INFORMATION MANAGEMENT | Facility: CLINIC | Age: 83
End: 2023-10-30

## 2023-10-30 ENCOUNTER — MYC MEDICAL ADVICE (OUTPATIENT)
Dept: INTERNAL MEDICINE | Facility: CLINIC | Age: 83
End: 2023-10-30

## 2023-10-30 DIAGNOSIS — M48.061 SPINAL STENOSIS OF LUMBAR REGION: ICD-10-CM

## 2023-10-30 DIAGNOSIS — M54.59 INTRACTABLE LOW BACK PAIN: Primary | ICD-10-CM

## 2023-10-31 ENCOUNTER — OFFICE VISIT (OUTPATIENT)
Dept: FAMILY MEDICINE | Facility: CLINIC | Age: 83
End: 2023-10-31
Payer: MEDICARE

## 2023-10-31 VITALS
SYSTOLIC BLOOD PRESSURE: 146 MMHG | WEIGHT: 203 LBS | RESPIRATION RATE: 14 BRPM | HEART RATE: 90 BPM | TEMPERATURE: 98.3 F | OXYGEN SATURATION: 95 % | DIASTOLIC BLOOD PRESSURE: 79 MMHG | BODY MASS INDEX: 37.13 KG/M2

## 2023-10-31 DIAGNOSIS — L03.116 CELLULITIS OF LEFT LOWER EXTREMITY: Primary | ICD-10-CM

## 2023-10-31 LAB
BASOPHILS # BLD AUTO: 0 10E3/UL (ref 0–0.2)
BASOPHILS NFR BLD AUTO: 0 %
EOSINOPHIL # BLD AUTO: 0.1 10E3/UL (ref 0–0.7)
EOSINOPHIL NFR BLD AUTO: 1 %
ERYTHROCYTE [DISTWIDTH] IN BLOOD BY AUTOMATED COUNT: 14.4 % (ref 10–15)
ERYTHROCYTE [SEDIMENTATION RATE] IN BLOOD BY WESTERGREN METHOD: 7 MM/HR (ref 0–30)
HCT VFR BLD AUTO: 43.4 % (ref 35–47)
HGB BLD-MCNC: 14.7 G/DL (ref 11.7–15.7)
IMM GRANULOCYTES # BLD: 0 10E3/UL
IMM GRANULOCYTES NFR BLD: 0 %
LYMPHOCYTES # BLD AUTO: 1.5 10E3/UL (ref 0.8–5.3)
LYMPHOCYTES NFR BLD AUTO: 20 %
MCH RBC QN AUTO: 31.2 PG (ref 26.5–33)
MCHC RBC AUTO-ENTMCNC: 33.9 G/DL (ref 31.5–36.5)
MCV RBC AUTO: 92 FL (ref 78–100)
MONOCYTES # BLD AUTO: 0.6 10E3/UL (ref 0–1.3)
MONOCYTES NFR BLD AUTO: 8 %
NEUTROPHILS # BLD AUTO: 5.4 10E3/UL (ref 1.6–8.3)
NEUTROPHILS NFR BLD AUTO: 70 %
PLATELET # BLD AUTO: 265 10E3/UL (ref 150–450)
RBC # BLD AUTO: 4.71 10E6/UL (ref 3.8–5.2)
WBC # BLD AUTO: 7.7 10E3/UL (ref 4–11)

## 2023-10-31 PROCEDURE — 36415 COLL VENOUS BLD VENIPUNCTURE: CPT | Performed by: PHYSICIAN ASSISTANT

## 2023-10-31 PROCEDURE — 86140 C-REACTIVE PROTEIN: CPT | Performed by: PHYSICIAN ASSISTANT

## 2023-10-31 PROCEDURE — 84550 ASSAY OF BLOOD/URIC ACID: CPT | Performed by: PHYSICIAN ASSISTANT

## 2023-10-31 PROCEDURE — 85025 COMPLETE CBC W/AUTO DIFF WBC: CPT | Performed by: PHYSICIAN ASSISTANT

## 2023-10-31 PROCEDURE — 85652 RBC SED RATE AUTOMATED: CPT | Performed by: PHYSICIAN ASSISTANT

## 2023-10-31 PROCEDURE — 99214 OFFICE O/P EST MOD 30 MIN: CPT | Performed by: PHYSICIAN ASSISTANT

## 2023-10-31 RX ORDER — CEPHALEXIN 500 MG/1
500 CAPSULE ORAL 3 TIMES DAILY
Qty: 21 CAPSULE | Refills: 0 | Status: SHIPPED | OUTPATIENT
Start: 2023-10-31 | End: 2023-11-07

## 2023-10-31 NOTE — TELEPHONE ENCOUNTER
Delmer Back MD  [facility] Internal Medicine Support Pool; Desire Guillaume, RN1 hour ago (11:07 AM)     MB  Please teddy up the consultation they request.  I will gladly cosign.  Diagnosis is intractable low back pain.  Lumbar spinal stenosis.  Many thanks.  MARIA FERNANDA NINO

## 2023-10-31 NOTE — PROGRESS NOTES
URGENT CARE VISIT:    SUBJECTIVE:   Chief Complaint   Patient presents with    Foot Swelling     Left foot swelling and redness warm to touch x 5 days       Tia Barron is a 83 year old female who presents with a chief complaint of left foot swelling and redness.  Symptoms began 5 day(s) ago, are moderate and gradual onset. No known injury. She did bang it on something but denies pain. Nothing makes it better or worse. She treated it initially with no therapy. This is the first time this type of injury has occurred to this patient.     PMH: No past medical history on file.  Allergies: Hornet venom   Medications:   Current Outpatient Medications   Medication Sig Dispense Refill    amLODIPine (NORVASC) 10 MG tablet Take 1 tablet (10 mg) by mouth daily 90 tablet 3    aspirin 81 MG EC tablet [ASPIRIN 81 MG EC TABLET] Take 81 mg by mouth daily.      cephALEXin (KEFLEX) 500 MG capsule Take 1 capsule (500 mg) by mouth 3 times daily for 7 days 21 capsule 0    EPINEPHrine (ANY BX GENERIC EQUIV) 0.3 MG/0.3ML injection 2-pack Inject 0.3 mLs (0.3 mg) into the muscle as needed for anaphylaxis May repeat one time in 5-15 minutes if response to initial dose is inadequate. 2 each 4    gabapentin (NEURONTIN) 100 MG capsule Take 1 capsule (100 mg) by mouth 3 times daily 30 capsule 1    hydrochlorothiazide (HYDRODIURIL) 25 MG tablet Take 1 tablet (25 mg) by mouth daily 90 tablet 3    losartan (COZAAR) 50 MG tablet Take 1 tablet (50 mg) by mouth daily 90 tablet 3    metoprolol succinate ER (TOPROL XL) 25 MG 24 hr tablet TAKE 2 TABLETS(50 MG) BY MOUTH DAILY 180 tablet 3    Multiple Vitamins-Minerals (PRESERVISION AREDS PO)        Social History:   Social History     Tobacco Use    Smoking status: Former    Smokeless tobacco: Former     Quit date: 6/29/1985   Substance Use Topics    Alcohol use: Yes     Alcohol/week: 1.0 standard drink of alcohol       ROS:  Review of systems negative except as stated above.    OBJECTIVE:  BP (!)  146/79   Pulse 90   Temp 98.3  F (36.8  C)   Resp 14   Wt 92.1 kg (203 lb)   LMP  (LMP Unknown)   SpO2 95%   BMI 37.13 kg/m    GENERAL APPEARANCE: healthy, alert and no distress  MUSCULOSKELETAL: NTTP over left dorsal foot  EXTREMITIES: peripheral pulses normal  SKIN: moderate edema and erythema over left dorsal foot. Warm to touch  NEURO: sensation intact     Labs:  Results for orders placed or performed in visit on 10/31/23   ESR: Erythrocyte sedimentation rate     Status: Normal   Result Value Ref Range    Erythrocyte Sedimentation Rate 7 0 - 30 mm/hr   CBC with platelets and differential     Status: None   Result Value Ref Range    WBC Count 7.7 4.0 - 11.0 10e3/uL    RBC Count 4.71 3.80 - 5.20 10e6/uL    Hemoglobin 14.7 11.7 - 15.7 g/dL    Hematocrit 43.4 35.0 - 47.0 %    MCV 92 78 - 100 fL    MCH 31.2 26.5 - 33.0 pg    MCHC 33.9 31.5 - 36.5 g/dL    RDW 14.4 10.0 - 15.0 %    Platelet Count 265 150 - 450 10e3/uL    % Neutrophils 70 %    % Lymphocytes 20 %    % Monocytes 8 %    % Eosinophils 1 %    % Basophils 0 %    % Immature Granulocytes 0 %    Absolute Neutrophils 5.4 1.6 - 8.3 10e3/uL    Absolute Lymphocytes 1.5 0.8 - 5.3 10e3/uL    Absolute Monocytes 0.6 0.0 - 1.3 10e3/uL    Absolute Eosinophils 0.1 0.0 - 0.7 10e3/uL    Absolute Basophils 0.0 0.0 - 0.2 10e3/uL    Absolute Immature Granulocytes 0.0 <=0.4 10e3/uL   CBC with platelets and differential     Status: None    Narrative    The following orders were created for panel order CBC with platelets and differential.  Procedure                               Abnormality         Status                     ---------                               -----------         ------                     CBC with platelets and d...[[id number]]                      Final result                 Please view results for these tests on the individual orders.         ASSESSMENT:    ICD-10-CM    1. Cellulitis of left lower extremity  L03.116 CBC with platelets and  differential     Uric acid     ESR: Erythrocyte sedimentation rate     CRP, inflammation     CBC with platelets and differential     Uric acid     ESR: Erythrocyte sedimentation rate     CRP, inflammation     cephALEXin (KEFLEX) 500 MG capsule          PLAN:  30 minutes spent by me on the date of the encounter doing chart review, review of outside records, review of test results, interpretation of tests, patient visit, and documentation   Patient Instructions   Patient was educated on the natural course of condition. I suspect cellulitis. CBC and ESR were normal. Uric acid and CRP are pending. Take medication as prescribed. Side effects discussed.  Conservative measures discussed including over-the-counter analgesics (Tylenol or Ibuprofen). Observe carefully for any signs of increased redness or pain in the affected area. See your primary care provider if symptoms worsen or do not improve in 3 days. Seek emergency care if you develop severe pain, streaking, or fever.     Patient verbalized understanding and is agreeable to plan. The patient was discharged ambulatory and in stable condition.    Luh Hook PA-C on 10/31/2023 at 4:39 PM

## 2023-10-31 NOTE — TELEPHONE ENCOUNTER
Pts daughter calling in regards to the [employer] message that was sent yesterday. See [employer] message below.       Per pt she wants to get referred to [facility] in [place], by the [facility] surgeon Sherman Cunningham  [phone]

## 2023-10-31 NOTE — TELEPHONE ENCOUNTER
Order sent to [facility] (fax #[phone])    Insurance reviewed:  [facility]    D 725  Type IDM  Plan(s) COMPREHENSIVE    Plan Dates 01/01/2017 - open-ended  Blue Plan [employer] [place]    Member ID Code [id number]  Group ID [id number]  Group Name [employer]    ~[facility] Referral Coordinator

## 2023-11-01 LAB
CRP SERPL-MCNC: 5.41 MG/L
URATE SERPL-MCNC: 5.5 MG/DL (ref 2.4–5.7)

## 2023-11-07 DIAGNOSIS — E11.8 TYPE 2 DIABETES MELLITUS WITH COMPLICATION, WITHOUT LONG-TERM CURRENT USE OF INSULIN (H): ICD-10-CM

## 2023-11-07 RX ORDER — HYDROCHLOROTHIAZIDE 25 MG/1
25 TABLET ORAL DAILY
Qty: 90 TABLET | Refills: 3 | OUTPATIENT
Start: 2023-11-07

## 2023-11-07 RX ORDER — METOPROLOL SUCCINATE 25 MG/1
TABLET, EXTENDED RELEASE ORAL
Qty: 180 TABLET | Refills: 3 | OUTPATIENT
Start: 2023-11-07

## 2023-11-07 NOTE — TELEPHONE ENCOUNTER
Refill request for hydrochlorothiazide and metoprolol       Zia Koenig Jr., CMA on 11/7/2023 at 9:58 AM

## 2023-11-30 ENCOUNTER — OFFICE VISIT (OUTPATIENT)
Dept: INTERNAL MEDICINE | Facility: CLINIC | Age: 83
End: 2023-11-30
Payer: MEDICARE

## 2023-11-30 VITALS
OXYGEN SATURATION: 98 % | SYSTOLIC BLOOD PRESSURE: 150 MMHG | RESPIRATION RATE: 17 BRPM | WEIGHT: 194.5 LBS | HEIGHT: 62 IN | BODY MASS INDEX: 35.79 KG/M2 | DIASTOLIC BLOOD PRESSURE: 78 MMHG | HEART RATE: 72 BPM

## 2023-11-30 DIAGNOSIS — E11.69 TYPE 2 DIABETES MELLITUS WITH OTHER SPECIFIED COMPLICATION, WITHOUT LONG-TERM CURRENT USE OF INSULIN (H): ICD-10-CM

## 2023-11-30 DIAGNOSIS — I10 ESSENTIAL HYPERTENSION: Primary | ICD-10-CM

## 2023-11-30 PROCEDURE — 99214 OFFICE O/P EST MOD 30 MIN: CPT | Performed by: INTERNAL MEDICINE

## 2023-11-30 RX ORDER — HYDROCHLOROTHIAZIDE 25 MG/1
25 TABLET ORAL DAILY
Qty: 90 TABLET | Refills: 3 | Status: SHIPPED | OUTPATIENT
Start: 2023-11-30

## 2023-11-30 RX ORDER — LOSARTAN POTASSIUM 50 MG/1
50 TABLET ORAL DAILY
Qty: 90 TABLET | Refills: 3 | Status: SHIPPED | OUTPATIENT
Start: 2023-11-30

## 2023-11-30 RX ORDER — METOPROLOL SUCCINATE 25 MG/1
TABLET, EXTENDED RELEASE ORAL
Qty: 180 TABLET | Refills: 3 | Status: SHIPPED | OUTPATIENT
Start: 2023-11-30

## 2023-11-30 RX ORDER — GABAPENTIN 100 MG/1
100 CAPSULE ORAL 3 TIMES DAILY
Qty: 30 CAPSULE | Refills: 1 | Status: CANCELLED | OUTPATIENT
Start: 2023-11-30

## 2023-11-30 RX ORDER — AMLODIPINE BESYLATE 10 MG/1
10 TABLET ORAL DAILY
Qty: 90 TABLET | Refills: 3 | Status: SHIPPED | OUTPATIENT
Start: 2023-11-30

## 2023-11-30 ASSESSMENT — PAIN SCALES - GENERAL: PAINLEVEL: NO PAIN (0)

## 2023-11-30 NOTE — PROGRESS NOTES
"Assessment/Plan:    Hypertension with mild systolic elevation.  150/78 continue same meds refills done.    Obesity BMI 36 emphasized importance of weight loss with caloric restriction regular exercise.    Irritable bowel syndrome with past health history of diverticulitis coli.  Suggest Metamucil 1 heaping tablespoon daily.    Low back pain weakness in the leg especially left after epidural spinal injection discussed no atrophy.    Cellulitis left lower extremity resolved after cephalexin.  Walk-in care treatment.  Accompanied today by daughter very supportive.  We had a good discussion.    25 minutes spent on the date of the encounter doing chart review, patient visit, and documentation     Subjective:  Tia Barron is a 83 year old female presents for the following health issues history as above epidural spinal injection subsequently pain is gone and the sciatica distribution left lower extremity but weakness now is in the left leg cannot stand or walk uses a wheeled walker.  Cellulitis recently treated walk-in care cephalexin better now.  Cracks in skin noted.  Dry skin leading to avenues for bacteria to enter the body.  Not septic.  Not hospitalized.  Oral cephalexin did the trick.    ROS:  No blood in stool or urine denies chest pain shortness of breath medication list reviewed reconciled in the chart.    Objective:  BP (!) 150/78 (BP Location: Right arm, Patient Position: Sitting, Cuff Size: Adult Large)   Pulse 72   Resp 17   Ht 1.575 m (5' 2\")   Wt 88.2 kg (194 lb 8 oz)   LMP  (LMP Unknown)   SpO2 98%   BMI 35.57 kg/m    Chest was clear heart tones normal abdomen obese extremities free of edema neck veins nondistended dry skin noted no carotid bruits no thyromegaly.  Pleasant easily conversant good spirited daughter Georgina is at her side very supportive.    Delmer Back MD  Internal Medicine    Answers submitted by the patient for this visit:  Lipid Visit (Submitted on 11/30/2023)  Chief " Complaint: Chronic problems general questions HPI Form  Are you regularly taking any medication or supplement to lower your cholesterol?: No  Are you having muscle aches or other side effects that you think could be caused by your cholesterol lowering medication?: No  Hypertension Visit (Submitted on 11/30/2023)  Chief Complaint: Chronic problems general questions HPI Form  Do you check your blood pressure regularly outside of the clinic?: No  Are your blood pressures ever more than 140 on the top number (systolic) OR more than 90 on the bottom number (diastolic)? (For example, greater than 140/90): Yes  Are you following a low salt diet?: Yes  General Questionnaire (Submitted on 11/30/2023)  Chief Complaint: Chronic problems general questions HPI Form  How many servings of fruits and vegetables do you eat daily?: 2-3  On average, how many sweetened beverages do you drink each day (Examples: soda, juice, sweet tea, etc.  Do NOT count diet or artificially sweetened beverages)?: 0  How many minutes a day do you exercise enough to make your heart beat faster?: 10 to 19  How many days a week do you exercise enough to make your heart beat faster?: 4  How many days per week do you miss taking your medication?: 0

## 2023-12-15 ENCOUNTER — TRANSFERRED RECORDS (OUTPATIENT)
Dept: MULTI SPECIALTY CLINIC | Facility: CLINIC | Age: 83
End: 2023-12-15

## 2023-12-15 LAB — RETINOPATHY: NORMAL

## 2024-02-08 ENCOUNTER — OFFICE VISIT (OUTPATIENT)
Dept: FAMILY MEDICINE | Facility: CLINIC | Age: 84
End: 2024-02-08
Payer: MEDICARE

## 2024-02-08 VITALS
HEIGHT: 62 IN | SYSTOLIC BLOOD PRESSURE: 120 MMHG | OXYGEN SATURATION: 96 % | WEIGHT: 191.2 LBS | DIASTOLIC BLOOD PRESSURE: 70 MMHG | RESPIRATION RATE: 26 BRPM | BODY MASS INDEX: 35.19 KG/M2 | TEMPERATURE: 98.3 F | HEART RATE: 79 BPM

## 2024-02-08 DIAGNOSIS — M48.07 STENOSIS OF LATERAL RECESS OF LUMBOSACRAL SPINE: ICD-10-CM

## 2024-02-08 DIAGNOSIS — R73.03 PREDIABETES: ICD-10-CM

## 2024-02-08 DIAGNOSIS — E66.09 CLASS 1 OBESITY DUE TO EXCESS CALORIES WITH SERIOUS COMORBIDITY AND BODY MASS INDEX (BMI) OF 34.0 TO 34.9 IN ADULT: ICD-10-CM

## 2024-02-08 DIAGNOSIS — I10 ESSENTIAL HYPERTENSION: ICD-10-CM

## 2024-02-08 DIAGNOSIS — E66.811 CLASS 1 OBESITY DUE TO EXCESS CALORIES WITH SERIOUS COMORBIDITY AND BODY MASS INDEX (BMI) OF 34.0 TO 34.9 IN ADULT: ICD-10-CM

## 2024-02-08 DIAGNOSIS — M47.817 ARTHROPATHY OF LUMBOSACRAL FACET JOINT: ICD-10-CM

## 2024-02-08 DIAGNOSIS — R29.898 LEFT LEG WEAKNESS: ICD-10-CM

## 2024-02-08 DIAGNOSIS — M51.27 LUMBOSACRAL DISC HERNIATION: ICD-10-CM

## 2024-02-08 DIAGNOSIS — R26.81 GAIT INSTABILITY: ICD-10-CM

## 2024-02-08 DIAGNOSIS — R53.81 PHYSICAL DECONDITIONING: ICD-10-CM

## 2024-02-08 DIAGNOSIS — Z01.818 PREOP GENERAL PHYSICAL EXAM: Primary | ICD-10-CM

## 2024-02-08 PROBLEM — M47.816 FACET ARTHROPATHY, LUMBAR: Status: ACTIVE | Noted: 2024-01-25

## 2024-02-08 PROBLEM — M54.17 LUMBOSACRAL RADICULOPATHY: Status: ACTIVE | Noted: 2024-01-25

## 2024-02-08 PROBLEM — M48.061 STENOSIS OF LATERAL RECESS OF LUMBAR SPINE: Status: ACTIVE | Noted: 2024-01-25

## 2024-02-08 LAB
HBA1C MFR BLD: 5.9 % (ref 0–5.6)
HGB BLD-MCNC: 15.5 G/DL (ref 11.7–15.7)

## 2024-02-08 PROCEDURE — 80048 BASIC METABOLIC PNL TOTAL CA: CPT | Performed by: NURSE PRACTITIONER

## 2024-02-08 PROCEDURE — 85018 HEMOGLOBIN: CPT | Performed by: NURSE PRACTITIONER

## 2024-02-08 PROCEDURE — 36415 COLL VENOUS BLD VENIPUNCTURE: CPT | Performed by: NURSE PRACTITIONER

## 2024-02-08 PROCEDURE — 99214 OFFICE O/P EST MOD 30 MIN: CPT | Performed by: NURSE PRACTITIONER

## 2024-02-08 PROCEDURE — 83036 HEMOGLOBIN GLYCOSYLATED A1C: CPT | Performed by: NURSE PRACTITIONER

## 2024-02-08 RX ORDER — ANTIOX #8/OM3/DHA/EPA/LUT/ZEAX 250-2.5 MG
CAPSULE ORAL
COMMUNITY

## 2024-02-08 ASSESSMENT — PAIN SCALES - GENERAL: PAINLEVEL: NO PAIN (1)

## 2024-02-08 NOTE — PATIENT INSTRUCTIONS
Preparing for Your Surgery  Getting started  A nurse will call you to review your health history and instructions. They will give you an arrival time based on your scheduled surgery time. Please be ready to share:  Your doctor's clinic name and phone number  Your medical, surgical, and anesthesia history  A list of allergies and sensitivities  A list of medicines, including herbal treatments and over-the-counter drugs  Whether the patient has a legal guardian (ask how to send us the papers in advance)  Please tell us if you're pregnant--or if there's any chance you might be pregnant. Some surgeries may injure a fetus (unborn baby), so they require a pregnancy test. Surgeries that are safe for a fetus don't always need a test, and you can choose whether to have one.   If you have a child who's having surgery, please ask for a copy of Preparing for Your Child's Surgery.    Preparing for surgery  Within 10 to 30 days of surgery: Have a pre-op exam (sometimes called an H&P, or History and Physical). This can be done at a clinic or pre-operative center.  If you're having a , you may not need this exam. Talk to your care team.  At your pre-op exam, talk to your care team about all medicines you take. If you need to stop any medicines before surgery, ask when to start taking them again.  We do this for your safety. Many medicines can make you bleed too much during surgery. Some change how well surgery (anesthesia) drugs work.  Call your insurance company to let them know you're having surgery. (If you don't have insurance, call [phone].)  Call your clinic if there's any change in your health. This includes signs of a cold or flu (sore throat, runny nose, cough, rash, fever). It also includes a scrape or scratch near the surgery site.  If you have questions on the day of surgery, call your hospital or surgery center.  Eating and drinking guidelines  For your safety: Unless your surgeon tells you otherwise,  follow the guidelines below.  Eat and drink as usual until 8 hours before you arrive for surgery. After that, no food or milk.  Drink clear liquids until 2 hours before you arrive. These are liquids you can see through, like water, Gatorade, and Propel Water. They also include plain black coffee and tea (no cream or milk), candy, and breath mints. You can spit out gum when you arrive.  If you drink alcohol: Stop drinking it the night before surgery.  If your care team tells you to take medicine on the morning of surgery, it's okay to take it with a sip of water.  Preventing infection  Shower or bathe the night before and morning of your surgery. Follow the instructions your clinic gave you. (If no instructions, use regular soap.)  Don't shave or clip hair near your surgery site. We'll remove the hair if needed.  Don't smoke or vape the morning of surgery. You may chew nicotine gum up to 2 hours before surgery. A nicotine patch is okay.  Note: Some surgeries require you to completely quit smoking and nicotine. Check with your surgeon.  Your care team will make every effort to keep you safe from infection. We will:  Clean our hands often with soap and water (or an alcohol-based hand rub).  Clean the skin at your surgery site with a special soap that kills germs.  Give you a special gown to keep you warm. (Cold raises the risk of infection.)  Wear special hair covers, masks, gowns and gloves during surgery.  Give antibiotic medicine, if prescribed. Not all surgeries need antibiotics.  What to bring on the day of surgery  Photo ID and insurance card  Copy of your health care directive, if you have one  Glasses and hearing aids (bring cases)  You can't wear contacts during surgery  Inhaler and eye drops, if you use them (tell us about these when you arrive)  CPAP machine or breathing device, if you use them  A few personal items, if spending the night  If you have . . .  A pacemaker, ICD (cardiac defibrillator) or other  implant: Bring the ID card.  An implanted stimulator: Bring the remote control.  A legal guardian: Bring a copy of the certified (court-stamped) guardianship papers.  Please remove any jewelry, including body piercings. Leave jewelry and other valuables at home.  If you're going home the day of surgery  You must have a responsible adult drive you home. They should stay with you overnight as well.  If you don't have someone to stay with you, and you aren't safe to go home alone, we may keep you overnight. Insurance often won't pay for this.  After surgery  If it's hard to control your pain or you need more pain medicine, please call your surgeon's office.  Questions?   If you have any questions for your care team, list them here: _________________________________________________________________________________________________________________________________________________________________________ ____________________________________ ____________________________________ ____________________________________  For informational purposes only. Not to replace the advice of your health care provider. Copyright   2003, 2019 [facility]. All rights reserved. Clinically reviewed by Elizabeth Hilario MD. [employer] 044748 - REV 12/22.    How to Take Your Medication Before Surgery  - Take all of your medications before surgery except as noted below  - HOLD (do not take) your HYDROCHLOROTHIAZIDE on the morning of surgery.   - STOP taking all vitamins and herbal supplements 14 days before surgery.

## 2024-02-08 NOTE — LETTER
[address] [address]  [phone]    FACSIMILE TRANSMITTAL SHEET    TO: Dr. Sehrman Cunningham   COMPANY: [facility]  FAX NUMBER: [phone]  PHONE NUMBER:      FROM: Francisca Hair CNP  PHONE: [phone]  DATE: 02/12/24  NUMBER OF PAGES:     _____URGENT _____REVIEW ONLY _____PLEASE COMMENT____PLEASE REPLY    NOTES/COMMENTS: patient has surgery 02/19/24                                      IF YOU DID NOT RECEIVE THE CORRECT NUMBER OF PAGES OR THE FAX DID NOT COME THROUGH CLEARLY, PLEASE CALL THE SENDER     CONFIDENTIALITY STATEMENT: Confidential information that may accompany this transmission contains protected health information under state and federal law and is legally privileged. This information is intended only for the use of the individual or entity named above and may be used only for carrying out treatment, payment or other healthcare operations. The recipient or person responsible for delivering this information is prohibited by law from disclosing this information without proper authorization to any other party, unless required to do so by law or regulation. If you are not the intended recipient, you are hereby notified that any review, dissemination, distribution, or copying of this message is strictly prohibited. If you have received this communication in error, please destroy the materials and contact us immediately by calling the number listed above. No response indicates that the information was received by the appropriate authorized party

## 2024-02-08 NOTE — PROGRESS NOTES
Preoperative Evaluation  [address] [address]  Phone: [phone]  Primary Provider: Delmer Back  Pre-op Performing Provider: PRIYANK ARNOLD  Feb 8, 2024       Hanna is a 83 year old, presenting for the following:  Pre-Op Exam        2/8/2024     8:47 AM   Additional Questions   Roomed by Whit   Accompanied by Daughter Georgina         2/8/2024     8:47 AM   Patient Reported Additional Medications   Patient reports taking the following new medications Tylenol Arthritis      Surgical Information  Surgery/Procedure: 1. Bilateral lumbar 3-lumbar 4 extended decompressive hemilaminectomy/micro foraminotomy/microdiskectomy from a right-sided approach. 2. Bilateral lumbar 4-lumbar 5 and lumbar 5-sacral 1 extended decompressive hemilaminectomies/micro foraminotomies/mi...   [facility]   Surgeon: Sherman Cunningham   Surgery Date: 02/19/24  Time of Surgery:   Where patient plans to recover: At a TCU (Transitional Care Unit)   Fax number for surgical facility: [phone]    Assessment & Plan     The proposed surgical procedure is considered INTERMEDIATE risk.    Preop general physical exam  Cleared for surgery.     Arthropathy of lumbosacral facet joint  Reason for surgery. Hemoglobin stable.     - Hemoglobin    Lumbosacral disc herniation  Reason for surgery.     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     Left leg weakness  Reason for surgery.     Physical deconditioning  Reason for surgery.     Gait instability  Reason for surgery.     Essential Hypertension  Stable in clinic. BMP ordered.     - Basic metabolic panel  (Ca, Cl, CO2, Creat, Gluc, K, Na, BUN)    Prediabetes  Stable A1c 8 months ago. Improved today at 5.9.    - Hemoglobin A1c    Class 1 obesity due to excess calories with serious comorbidity and body mass index (BMI) of 34.0 to 34.9 in adult  Slightly decreased from previous weight.             Risks and Recommendations  The patient has the following additional risks and recommendations for perioperative complications:   - No identified additional risk factors other than previously addressed    Antiplatelet or Anticoagulation Medication Instructions   - aspirin: Discontinue aspirin 7-10 days prior to procedure to reduce bleeding risk. It should be resumed postoperatively.     Additional Medication Instructions  Patient is to take all scheduled medications on the day of surgery EXCEPT for modifications listed below:   - ACE/ARB: Continue without modification (e.g., MAC anesthesia, neurosurgery, spine surgery, heart failure, or labile hypertension with risk of hypertension).   - Beta Blockers: Continue taking on the day of surgery.   - Calcium Channel Blockers: May be continued on the day of surgery.   - Diuretics: HOLD on the day of surgery.   - Herbal medications and vitamins: HOLD 14 days prior to surgery.    Recommendation  APPROVAL GIVEN to proceed with proposed procedure, without further diagnostic evaluation.    Ordering of each unique test      Subjective       Via the Health Maintenance questionnaire, the patient has reported the following services have been completed -Eye Exam, this information has been sent to the abstraction team.    HPI related to upcoming procedure: Lumbosacral disc herniation with left leg numbness and pain at times with subsequent gait instability requiring walker.         2/4/2024     7:03 PM   Preop Questions   1. Have you ever had a heart attack or stroke? No   2. Have you ever had surgery on your heart or blood vessels, such as a stent placement, a coronary artery bypass, or surgery on an artery in your head, neck, heart, or legs? No   3. Do you have chest pain with activity? No   4. Do you have a history of  heart failure? No   5. Do you currently have a cold, bronchitis or symptoms of other infection? No   6. Do you have a cough, shortness of breath, or wheezing? No    7. Do you or anyone in your family have previous history of blood clots? No   8. Do you or does anyone in your family have a serious bleeding problem such as prolonged bleeding following surgeries or cuts? No   9. Have you ever had problems with anemia or been told to take iron pills? No   10. Have you had any abnormal blood loss such as black, tarry or bloody stools, or abnormal vaginal bleeding? No   11. Have you ever had a blood transfusion? No   12. Are you willing to have a blood transfusion if it is medically needed before, during, or after your surgery? Yes   13. Have you or any of your relatives ever had problems with anesthesia? No   14. Do you have sleep apnea, excessive snoring or daytime drowsiness? No   15. Do you have any artifical heart valves or other implanted medical devices like a pacemaker, defibrillator, or continuous glucose monitor? No   16. Do you have artificial joints? No   17. Are you allergic to latex? No       Health Care Directive  Patient has a Health Care Directive on file      Preoperative Review of    reviewed - controlled substances reflected in medication list.      Status of Chronic Conditions:  HYPERTENSION - Patient has longstanding history of HTN , currently denies any symptoms referable to elevated blood pressure. Specifically denies chest pain, palpitations, dyspnea, orthopnea, PND or peripheral edema. Blood pressure readings have been in normal range. Current medication regimen is as listed below. Patient denies any side effects of medication.     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  Past Surgical History:   Procedure Laterality Date    HYSTERECTOMY  1978    [facility] APPENDECTOMY      Description: Appendectomy;  Recorded: 02/27/2008;    [facility] TOTAL ABDOM HYSTERECTOMY      Description: Hysterectomy;  Recorded: 02/27/2008;    [facility] TOTAL ABDOM HYSTERECTOMY      Description: Hysterectomy;  Recorded: 02/27/2008;     Current Outpatient Medications   Medication Sig Dispense Refill    amLODIPine (NORVASC) 10 MG tablet Take 1 tablet (10 mg) by mouth daily 90 tablet 3    EPINEPHrine (ANY BX GENERIC EQUIV) 0.3 MG/0.3ML injection 2-pack Inject 0.3 mLs (0.3 mg) into the muscle as needed for anaphylaxis May repeat one time in 5-15 minutes if response to initial dose is inadequate. 2 each 4    hydrochlorothiazide (HYDRODIURIL) 25 MG tablet Take 1 tablet (25 mg) by mouth daily 90 tablet 3    losartan (COZAAR) 50 MG tablet Take 1 tablet (50 mg) by mouth daily 90 tablet 3    metoprolol succinate ER (TOPROL XL) 25 MG 24 hr tablet TAKE 2 TABLETS(50 MG) BY MOUTH DAILY 180 tablet 3    aspirin 81 MG EC tablet [ASPIRIN 81 MG EC TABLET] Take 81 mg by mouth daily. (Patient not taking: Reported on 2/8/2024)      gabapentin (NEURONTIN) 100 MG capsule Take 1 capsule (100 mg)  "by mouth 3 times daily (Patient not taking: Reported on 2/8/2024) 30 capsule 1    Multiple Vitamins-Minerals (PRESERVISION AREDS PO)  (Patient not taking: Reported on 2/8/2024)         Allergies   Allergen Reactions    Hornet Venom Anaphylaxis        Social History     Tobacco Use    Smoking status: Former    Smokeless tobacco: Former     Quit date: 6/29/1985   Substance Use Topics    Alcohol use: Yes     Alcohol/week: 1.0 standard drink of alcohol     Family History   Problem Relation Age of Onset    Hereditary Breast and Ovarian Cancer Syndrome No family hx of     Breast Cancer No family hx of     Cancer No family hx of     Colon Cancer No family hx of     Endometrial Cancer No family hx of     Ovarian Cancer No family hx of      History   Drug Use No         Review of Systems    Review of Systems  CONSTITUTIONAL: NEGATIVE for fever, chills, change in weight  INTEGUMENTARY/SKIN: NEGATIVE for worrisome rashes, moles or lesions  EYES: NEGATIVE for vision changes or irritation  ENT/MOUTH: NEGATIVE for ear, mouth and throat problems  RESP: NEGATIVE for significant cough or SOB  BREAST: NEGATIVE for masses, tenderness or discharge  CV: NEGATIVE for chest pain, palpitations or peripheral edema  CV: ankle swelling at baseline  GI: NEGATIVE for nausea, abdominal pain, heartburn, or change in bowel habits  : NEGATIVE for frequency, dysuria, or hematuria  MUSCULOSKELETAL: NEGATIVE for significant arthralgias or myalgia  NEURO: NEGATIVE for weakness, dizziness or paresthesias  ENDOCRINE: NEGATIVE for temperature intolerance, skin/hair changes  HEME: NEGATIVE for bleeding problems  PSYCHIATRIC: NEGATIVE for changes in mood or affect  Objective    /70 (BP Location: Left arm, Patient Position: Sitting, Cuff Size: Adult Large)   Pulse 79   Temp 98.3  F (36.8  C) (Oral)   Resp 26   Ht 1.575 m (5' 2\")   Wt 86.7 kg (191 lb 3.2 oz)   LMP  (LMP Unknown)   SpO2 96%   BMI 34.97 kg/m     Estimated body mass index is " "34.97 kg/m  as calculated from the following:    Height as of this encounter: 1.575 m (5' 2\").    Weight as of this encounter: 86.7 kg (191 lb 3.2 oz).  Physical Exam  GENERAL: alert and no distress  EYES: Eyes grossly normal to inspection, PERRL and conjunctivae and sclerae normal  HENT: ear canals and TM's normal, nose and mouth without ulcers or lesions  NECK: no adenopathy, no asymmetry, masses, or scars  RESP: lungs clear to auscultation - no rales, rhonchi or wheezes  CV: regular rate and rhythm, normal S1 S2, no S3 or S4, no murmur, click or rub, no peripheral edema  ABDOMEN: soft, nontender, no hepatosplenomegaly, no masses and bowel sounds normal  MS: no gross musculoskeletal defects noted, no edema  SKIN: no suspicious lesions or rashes  NEURO: Normal strength and tone, mentation intact and speech normal  PSYCH: mentation appears normal, affect normal/bright    Recent Labs   Lab Test 10/31/23  1602 06/15/23  0946 02/02/23  0742   HGB 14.7  --   --      --   --    A1C  --  6.3* 6.3*        Diagnostics  Recent Results (from the past 168 hour(s))   Hemoglobin A1c    Collection Time: 02/08/24  9:57 AM   Result Value Ref Range    Hemoglobin A1C 5.9 (H) 0.0 - 5.6 %   Basic metabolic panel  (Ca, Cl, CO2, Creat, Gluc, K, Na, BUN)    Collection Time: 02/08/24  9:57 AM   Result Value Ref Range    Sodium 142 135 - 145 mmol/L    Potassium 4.7 3.4 - 5.3 mmol/L    Chloride 101 98 - 107 mmol/L    Carbon Dioxide (CO2) 28 22 - 29 mmol/L    Anion Gap 13 7 - 15 mmol/L    Urea Nitrogen 14.9 8.0 - 23.0 mg/dL    Creatinine 0.89 0.51 - 0.95 mg/dL    GFR Estimate 63 >60 mL/min/1.73m2    Calcium 10.5 (H) 8.8 - 10.2 mg/dL    Glucose 140 (H) 70 - 99 mg/dL   Hemoglobin    Collection Time: 02/08/24  9:57 AM   Result Value Ref Range    Hemoglobin 15.5 11.7 - 15.7 g/dL      No EKG required, no history of coronary heart disease, significant arrhythmia, peripheral arterial disease or other structural heart disease.    Revised " Cardiac Risk Index (RCRI)  The patient has the following serious cardiovascular risks for perioperative complications:   - No serious cardiac risks = 0 points     RCRI Interpretation: 0 points: Class I (very low risk - 0.4% complication rate)         Signed Electronically by: Francisca Hair DNP  Copy of this evaluation report is provided to requesting physician.

## 2024-02-09 LAB
ANION GAP SERPL CALCULATED.3IONS-SCNC: 13 MMOL/L (ref 7–15)
BUN SERPL-MCNC: 14.9 MG/DL (ref 8–23)
CALCIUM SERPL-MCNC: 10.5 MG/DL (ref 8.8–10.2)
CHLORIDE SERPL-SCNC: 101 MMOL/L (ref 98–107)
CREAT SERPL-MCNC: 0.89 MG/DL (ref 0.51–0.95)
DEPRECATED HCO3 PLAS-SCNC: 28 MMOL/L (ref 22–29)
EGFRCR SERPLBLD CKD-EPI 2021: 63 ML/MIN/1.73M2
GLUCOSE SERPL-MCNC: 140 MG/DL (ref 70–99)
POTASSIUM SERPL-SCNC: 4.7 MMOL/L (ref 3.4–5.3)
SODIUM SERPL-SCNC: 142 MMOL/L (ref 135–145)

## 2024-02-12 DIAGNOSIS — E83.52 SERUM CALCIUM ELEVATED: ICD-10-CM

## 2024-02-12 DIAGNOSIS — R73.09 OTHER ABNORMAL GLUCOSE: Primary | ICD-10-CM

## 2024-03-06 ENCOUNTER — TELEPHONE (OUTPATIENT)
Dept: INTERNAL MEDICINE | Facility: CLINIC | Age: 84
End: 2024-03-06
Payer: MEDICARE

## 2024-03-07 ENCOUNTER — MEDICAL CORRESPONDENCE (OUTPATIENT)
Dept: HEALTH INFORMATION MANAGEMENT | Facility: CLINIC | Age: 84
End: 2024-03-07
Payer: MEDICARE

## 2024-03-07 NOTE — TELEPHONE ENCOUNTER
Daughter Georgina is calling regarding admittance form for [employer] .  Requesting form is faxed to  as soon as it is completed.

## 2024-03-07 NOTE — TELEPHONE ENCOUNTER
----- Message from Janet Galvin sent at 1/7/2022  4:24 PM CST -----    Patient  requesting refill of the Zofran 4 mg tablets dissolvable- she is almost out, please sent to pharmacy listed [facility] on [address]   Patient is requesting appt, no dates come up for me to schedule  Please contact patient to advise in regards     Please reach out to patient to schedule  @# [phone]      Form faxed to [phone]    Copies made for monthly hold bin and sent to leanne CASTELLANOS on daughter's phone that form was completed and faxed out.

## 2024-04-13 ENCOUNTER — MEDICAL CORRESPONDENCE (OUTPATIENT)
Dept: HEALTH INFORMATION MANAGEMENT | Facility: CLINIC | Age: 84
End: 2024-04-13
Payer: MEDICARE

## 2024-05-09 ENCOUNTER — OFFICE VISIT (OUTPATIENT)
Dept: INTERNAL MEDICINE | Facility: CLINIC | Age: 84
End: 2024-05-09
Payer: MEDICARE

## 2024-05-09 VITALS
WEIGHT: 175 LBS | BODY MASS INDEX: 32.01 KG/M2 | TEMPERATURE: 98.1 F | HEART RATE: 97 BPM | RESPIRATION RATE: 20 BRPM | SYSTOLIC BLOOD PRESSURE: 138 MMHG | OXYGEN SATURATION: 95 % | DIASTOLIC BLOOD PRESSURE: 70 MMHG

## 2024-05-09 DIAGNOSIS — E11.69 TYPE 2 DIABETES MELLITUS WITH OTHER SPECIFIED COMPLICATION, WITHOUT LONG-TERM CURRENT USE OF INSULIN (H): Primary | ICD-10-CM

## 2024-05-09 DIAGNOSIS — I10 ESSENTIAL HYPERTENSION: ICD-10-CM

## 2024-05-09 DIAGNOSIS — Z23 NEED FOR TDAP VACCINATION: ICD-10-CM

## 2024-05-09 DIAGNOSIS — E66.01 MORBID OBESITY (H): ICD-10-CM

## 2024-05-09 DIAGNOSIS — Z23 NEED FOR SHINGLES VACCINE: ICD-10-CM

## 2024-05-09 PROCEDURE — 99214 OFFICE O/P EST MOD 30 MIN: CPT | Performed by: INTERNAL MEDICINE

## 2024-05-09 NOTE — PROGRESS NOTES
Assessment/Plan:    Hypertension controlled stable 138/70 same meds.    Chronic lumbar disc disease with recent back surgery February 19, 2024 good results now in assisted living.  Dr. Enrique Cunningham neurosurgeon.  [facility].  L3-L4 left side.    Diabetes mellitus type 2 will check A1c plus lipid panel blood sugar in 4 months time fasting.    Obesity BMI 32 discussed the importance of weight loss with caloric restriction and regular exercise.  The patient has been successful in losing some weight we had a good discussion today.  Accompanied by a female friend.    25 minutes spent on the date of the encounter doing chart review, patient visit, and documentation     Subjective:  Tia Barron is a 84 year old female presents for the following health issues easily conversant good spirited not in acute distress.  Recent back surgery as outlined above [facility] Dr. Enrique Cunningham neurosurgeon presiding    ROS:  Dependent edema no blood in stool or urine discussed salt restriction leg elevation.  Amlodipine is primary side effect edema.  Med list reviewed reconciled in the chart generally well-tolerated.    Objective:  /70 (BP Location: Right arm, Patient Position: Sitting, Cuff Size: Adult Regular)   Pulse 97   Temp 98.1  F (36.7  C) (Oral)   Resp 20   Wt 79.4 kg (175 lb)   LMP  (LMP Unknown)   SpO2 95%   BMI 32.01 kg/m    Neck veins nondistended lungs are clear anteriorly posteriorly heart tones regular rhythm no carotid bruits belly soft nontender obese trace edema noted both lower extremities without cyanosis or clubbing.  Skin fair complected lymph negative neuro negative no neck vein distention or thyromegaly no thyroid nodules.  Neatly attired easily conversant good spirited    Delmer Back MD  Internal Medicine    The longitudinal plan of care for the diagnosis(es)/condition(s) as documented were addressed during this visit. Due to the added complexity in care, I will continue  to support Hanna in the subsequent management and with ongoing continuity of care.      Answers submitted by the patient for this visit:  Hypertension Visit (Submitted on 5/2/2024)  Chief Complaint: Chronic problems general questions HPI Form  Do you check your blood pressure regularly outside of the clinic?: No  Are your blood pressures ever more than 140 on the top number (systolic) OR more than 90 on the bottom number (diastolic)? (For example, greater than 140/90): No  Are you following a low salt diet?: Yes  General Questionnaire (Submitted on 5/2/2024)  Chief Complaint: Chronic problems general questions HPI Form  How many servings of fruits and vegetables do you eat daily?: 4 or more  On average, how many sweetened beverages do you drink each day (Examples: soda, juice, sweet tea, etc.  Do NOT count diet or artificially sweetened beverages)?: 1  How many minutes a day do you exercise enough to make your heart beat faster?: 10 to 19  How many days a week do you exercise enough to make your heart beat faster?: 4  How many days per week do you miss taking your medication?: 0

## 2024-06-18 DIAGNOSIS — T63.481A ANAPHYLAXIS DUE TO INSECT VENOM: ICD-10-CM

## 2024-06-18 DIAGNOSIS — T78.2XXA ANAPHYLAXIS DUE TO INSECT VENOM: ICD-10-CM

## 2024-06-18 RX ORDER — EPINEPHRINE 0.3 MG/.3ML
INJECTION SUBCUTANEOUS
Qty: 2 EACH | Refills: 2 | Status: SHIPPED | OUTPATIENT
Start: 2024-06-18

## 2024-06-21 ENCOUNTER — TRANSFERRED RECORDS (OUTPATIENT)
Dept: HEALTH INFORMATION MANAGEMENT | Facility: CLINIC | Age: 84
End: 2024-06-21
Payer: MEDICARE

## 2024-09-10 ENCOUNTER — OFFICE VISIT (OUTPATIENT)
Dept: INTERNAL MEDICINE | Facility: CLINIC | Age: 84
End: 2024-09-10
Payer: MEDICARE

## 2024-09-10 VITALS
OXYGEN SATURATION: 96 % | WEIGHT: 185 LBS | BODY MASS INDEX: 34.04 KG/M2 | RESPIRATION RATE: 18 BRPM | DIASTOLIC BLOOD PRESSURE: 72 MMHG | HEIGHT: 62 IN | HEART RATE: 82 BPM | SYSTOLIC BLOOD PRESSURE: 142 MMHG | TEMPERATURE: 98.1 F

## 2024-09-10 DIAGNOSIS — I10 ESSENTIAL HYPERTENSION: ICD-10-CM

## 2024-09-10 DIAGNOSIS — E11.69 TYPE 2 DIABETES MELLITUS WITH OTHER SPECIFIED COMPLICATION, WITHOUT LONG-TERM CURRENT USE OF INSULIN (H): Primary | ICD-10-CM

## 2024-09-10 PROCEDURE — G2211 COMPLEX E/M VISIT ADD ON: HCPCS | Performed by: INTERNAL MEDICINE

## 2024-09-10 PROCEDURE — 99214 OFFICE O/P EST MOD 30 MIN: CPT | Performed by: INTERNAL MEDICINE

## 2024-09-10 NOTE — PROGRESS NOTES
"Assessment/Plan:    Hypertension controlled 142/72 same meds advised.    Dependent edema lower extremities suggest support stockings on the morning off at night by David advised salt restriction.  On high-dose amlodipine 10 mg daily contributing.    Obesity BMI 34 discussed the importance of weight loss with caloric striction regular exercise.    Diabetes mellitus type 2 offered laboratory testing including A1c blood sugar lipid panel urine for microalbumin but patient declined at this time return to clinic 4 months time.    (E11.69) Type 2 diabetes mellitus with other specified complication, without long-term current use of insulin (H)  (primary encounter diagnosis)  Comment: Will recheck A1c blood sugar lipid panel plus urine for microalbumin with next visit.  Plan: Stable and will check A1c blood sugar lipid panel urine for microalbumin with next visit.    (I10) Essential hypertension  Comment: Hypertension and controlled on amlodipine high-dose 10 mg daily plus hydrochlorothiazide 25 mg daily and metoprolol.  With next visit will check serum potassium level.  Plan: Next visit we will check serum potassium level.        25 minutes spent on the date of the encounter doing chart review, patient visit, documentation, and discussion with family     Subjective:  Tia Barron is a 84 year old female presents for the following health issues accompanied today by her daughter-in-law who is very supportive.    ROS:  No blood in stool or urine med list reviewed reconciled in the chart denies chest pain shortness of breath.    Objective:  BP (!) 142/72   Pulse 82   Temp 98.1  F (36.7  C) (Oral)   Resp 18   Ht 1.575 m (5' 2\")   Wt 83.9 kg (185 lb)   LMP  (LMP Unknown)   SpO2 96%   BMI 33.84 kg/m    No neck vein distention.  Chest is clear no carotid bruits heart tones revealed a regular rhythm without murmur rub or gallop abdomen was benign obese without animated masses or tenderness extremities showed edema that " was chronic brawny in appearance with some erythema noted and thickening of the skin anterior tibial surface right side less so left side no lymphangitis afebrile today 98.1.  34 discussed see above.    Delmer Back MD  Internal Medicine    The longitudinal plan of care for the diagnosis(es)/condition(s) as documented were addressed during this visit. Due to the added complexity in care, I will continue to support Hanna in the subsequent management and with ongoing continuity of care.      Answers submitted by the patient for this visit:  Lipid Visit (Submitted on 9/5/2024)  Chief Complaint: Chronic problems general questions HPI Form  Are you regularly taking any medication or supplement to lower your cholesterol?: Yes  Are you having muscle aches or other side effects that you think could be caused by your cholesterol lowering medication?: No  Hypertension Visit (Submitted on 9/5/2024)  Chief Complaint: Chronic problems general questions HPI Form  Do you check your blood pressure regularly outside of the clinic?: No  Are your blood pressures ever more than 140 on the top number (systolic) OR more than 90 on the bottom number (diastolic)? (For example, greater than 140/90): Yes  Are you following a low salt diet?: Yes  General Questionnaire (Submitted on 9/5/2024)  Chief Complaint: Chronic problems general questions HPI Form  How many servings of fruits and vegetables do you eat daily?: 2-3  On average, how many sweetened beverages do you drink each day (Examples: soda, juice, sweet tea, etc.  Do NOT count diet or artificially sweetened beverages)?: 0  How many minutes a day do you exercise enough to make your heart beat faster?: 9 or less  How many days a week do you exercise enough to make your heart beat faster?: 3 or less  How many days per week do you miss taking your medication?: 0

## 2024-09-28 ENCOUNTER — HEALTH MAINTENANCE LETTER (OUTPATIENT)
Age: 84
End: 2024-09-28

## 2024-10-07 ENCOUNTER — OFFICE VISIT (OUTPATIENT)
Dept: INTERNAL MEDICINE | Facility: CLINIC | Age: 84
End: 2024-10-07
Payer: MEDICARE

## 2024-10-07 VITALS
DIASTOLIC BLOOD PRESSURE: 78 MMHG | RESPIRATION RATE: 16 BRPM | OXYGEN SATURATION: 96 % | SYSTOLIC BLOOD PRESSURE: 144 MMHG | HEART RATE: 73 BPM | HEIGHT: 62 IN | WEIGHT: 188 LBS | BODY MASS INDEX: 34.6 KG/M2 | TEMPERATURE: 98.1 F

## 2024-10-07 DIAGNOSIS — R73.03 PREDIABETES: ICD-10-CM

## 2024-10-07 DIAGNOSIS — Z01.818 PREOPERATIVE EXAMINATION: Primary | ICD-10-CM

## 2024-10-07 LAB
EST. AVERAGE GLUCOSE BLD GHB EST-MCNC: 126 MG/DL
FASTING STATUS PATIENT QL REPORTED: ABNORMAL
GLUCOSE SERPL-MCNC: 120 MG/DL (ref 70–99)
HBA1C MFR BLD: 6 % (ref 0–5.6)
HGB BLD-MCNC: 14.9 G/DL (ref 11.7–15.7)
POTASSIUM SERPL-SCNC: 4.7 MMOL/L (ref 3.4–5.3)

## 2024-10-07 PROCEDURE — 99214 OFFICE O/P EST MOD 30 MIN: CPT | Performed by: INTERNAL MEDICINE

## 2024-10-07 PROCEDURE — 85018 HEMOGLOBIN: CPT | Performed by: INTERNAL MEDICINE

## 2024-10-07 PROCEDURE — 83036 HEMOGLOBIN GLYCOSYLATED A1C: CPT | Performed by: INTERNAL MEDICINE

## 2024-10-07 PROCEDURE — 84132 ASSAY OF SERUM POTASSIUM: CPT | Performed by: INTERNAL MEDICINE

## 2024-10-07 PROCEDURE — 82947 ASSAY GLUCOSE BLOOD QUANT: CPT | Performed by: INTERNAL MEDICINE

## 2024-10-07 PROCEDURE — G2211 COMPLEX E/M VISIT ADD ON: HCPCS | Performed by: INTERNAL MEDICINE

## 2024-10-07 PROCEDURE — 36415 COLL VENOUS BLD VENIPUNCTURE: CPT | Performed by: INTERNAL MEDICINE

## 2024-10-07 ASSESSMENT — PAIN SCALES - GENERAL: PAINLEVEL: NO PAIN (0)

## 2024-10-07 NOTE — PROGRESS NOTES
Pre-Op Note:  Today's date: October 7, 2024    Tia Barron is a 84 year old female who presents for a preoperative evaluation.    Surgical Information:  Surgery/Procedure: Preoperative examination for cataract surgery right eye October 21, 2024 on Monday.  [place] [facility] Dr. Hancock at [facility].  [phone].  Surgery Location: [facility]  Surgeon: Dr. Hancock  Surgery Date: Monday, October 21, 2024  Fax number for surgical facility: [phone]    Subjective:   Impaired vision for this 84-year-old female.  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  She is the wife of the late Dr. Itz Barron gynecologist [facility].  Impaired vision right eye previous cataract surgery left eye.  No history of eye trauma no history of smoking no history of diabetes mellitus.  Prediabetes at most.  No recent steroid use did receive some steroids last year 2023 prior to lumbar back surgery.  Per second.    ROS:   14 point negative    Medications:     Current Outpatient Medications:     amLODIPine (NORVASC) 10 MG tablet, Take 1 tablet (10 mg) by mouth daily, Disp: 90 tablet, Rfl: 3    EPINEPHrine (ANY BX GENERIC EQUIV) 0.3 MG/0.3ML injection 2-pack, INJECT 1 PEN INTRAMUSCULARLY AS DIRECTED AS NEEDED FOR ANAPHYLAXIS. MAY REPEAT DOSE ONCE AFTER 5 TO 15 MINUTES IF NEEDED., Disp: 2 each, Rfl: 2    hydrochlorothiazide (HYDRODIURIL) 25 MG tablet, Take 1 tablet (25 mg) by mouth daily, Disp: 90 tablet, Rfl: 3    losartan (COZAAR) 50 MG tablet, Take 1 tablet (50 mg) by mouth daily, Disp: 90 tablet, Rfl: 3    metoprolol succinate ER (TOPROL XL) 25 MG 24 hr tablet, TAKE 2 TABLETS(50 MG) BY MOUTH DAILY, Disp: 180 tablet, Rfl: 3    Multiple Vitamins-Minerals (PRESERVISION AREDS 2) CAPS, , Disp: , Rfl:      Allergies:  Allergies   Allergen Reactions    Bee Venom Anaphylaxis    Hornet Venom Anaphylaxis       Immunizations:   Immunization History   Administered Date(s) Administered    COVID-19 12+ (Pfizer)  09/27/2023, 09/09/2024    COVID-19 Bivalent 12+ (Pfizer) 09/21/2022    COVID-19 MONOVALENT 12+ (Pfizer) 01/23/2021, 02/11/2021, 09/30/2021    COVID-19 Monovalent 12+ (Pfizer 2022) 05/05/2022    DT (PEDS <7y) 12/29/2004    Flu 65+ (Fluad) 09/13/2019    Flu, Unspecified 08/19/2012, 09/07/2015    Influenza (H1N1) 01/02/2010    Influenza (High Dose) Trivalent,PF (Fluzone) 09/06/2015, 09/11/2016, 09/19/2017, 10/01/2018, 09/09/2024    Influenza (IIV3) PF 10/27/2005, 08/31/2009, 08/27/2010, 08/19/2012, 09/22/2013    Influenza Vaccine 65+ (Fluzone HD) 09/12/2021, 10/05/2022, 09/06/2023    Influenza Vaccine, 6+MO IM (QUADRIVALENT W/PRESERVATIVES) 08/25/2011    Influenza, Split Virus, Trivalent, Pf (Fluzone\Fluarix) 09/10/2020    Influenza, seasonal, injectable, PF 09/10/2020    Pneumo Conj 13-V (2010&after) 07/28/2016    Pneumococcal 23 valent 12/29/2004    RSV Vaccine (Arexvy) 09/06/2023    TDAP (Adacel,Boostrix) 06/03/2013    Td (Adult), Adsorbed 12/29/2004    Td,adult,historic,unspecified 12/29/2004    Tdap (Adult) Unspecified Formulation 12/29/2004    Zoster vaccine, live 02/27/2008     Immunizations reviewed and up-to-date.    Health Maintenance:   Immunizations reviewed and up-to-date.  Vaccines up-to-date.    Past Medical History:   Past Medical History:   Diagnosis Date    Arthritis Gradual    Hypertension       Non-smoker.  No excess alcohol minimal.  Allergy bees venom.  Lumbar back surgeries x 2.  Cholecystectomy and hysterectomy jaw tumor removed macular hole repaired no anesthetic complications successful left eye surgery history of hypertension.  Past Surgical History:   Past Surgical History:   Procedure Laterality Date    BACK SURGERY      CHOLECYSTECTOMY      COLONOSCOPY      EYE SURGERY      HEAD & NECK SURGERY      HYSTERECTOMY  01/01/1978    [facility] APPENDECTOMY      Description: Appendectomy;  Recorded: 02/27/2008;    [facility] TOTAL ABDOM HYSTERECTOMY      Description: Hysterectomy;  Recorded: 02/27/2008;    [facility]  "TOTAL ABDOM HYSTERECTOMY      Description: Hysterectomy;  Recorded: 2008;        Family History:   Family History   Problem Relation Age of Onset    Other Cancer Mother         Glioblastoma    Coronary Artery Disease Father     Other Cancer Father         Kidney cancer    Hereditary Breast and Ovarian Cancer Syndrome No family hx of     Breast Cancer No family hx of     Cancer No family hx of     Colon Cancer No family hx of     Endometrial Cancer No family hx of     Ovarian Cancer No family hx of        Mother  78 glioblastoma.  Frontal lobe.    Father  hypernephroma age 83.  3 children well 4 grandchildren well 6 great-grandchildren.  Exam:  Vitals:BP (!) 144/78 (BP Location: Left arm, Patient Position: Sitting, Cuff Size: Adult Regular)   Pulse 73   Temp 98.1  F (36.7  C) (Oral)   Resp 16   Ht 1.575 m (5' 2\")   Wt 85.3 kg (188 lb)   LMP  (LMP Unknown)   SpO2 96%   BMI 34.39 kg/m    Easily conversant appears younger than stated age highly intelligent.  Accompanied by her daughter Georgina who is very supportive.    Chest clear heart tones normal abdomen benign mild centripetal obesity noted BMI 34+.  Blood pressure borderline 144/78 extremities free of edema cyanosis or clubbing neck veins nondistended no thyromegaly no thyroid nodules no carotid bruits skin lymph neuro all negative short cropped hair neatly attired.  Intelligent.    Assessment and Plan:  Medically acceptable risk for anticipated cataract surgery on the right eye 2024 at [facility] with Dr. Hancock preoperatively we will check hemoglobin potassium and blood sugar A1c.    Delmer Back MD    The longitudinal plan of care for the diagnosis(es)/condition(s) as documented were addressed during this visit. Due to the added complexity in care, I will continue to support Hanna in the subsequent management and with ongoing continuity of care.    "

## 2024-10-07 NOTE — PROGRESS NOTES
Preoperative Evaluation  [address] [address]  Phone: [phone]  Fax: [phone]  Primary Provider: Delmer Back MD  Pre-op Performing Provider: Delmer Back MD  Oct 7, 2024   {Provider  Link to PREOP SmartSet  REQUIRED to apply standard patient instructions and medication directions to the AVS :898868}  {ROOMER review and update patient entered surgical information if needed :984750}        10/2/2024   Surgical Information   What procedure is being done? Cataract   Facility or Hospital where procedure/surgery will be performed: [facility]   Who is doing the procedure / surgery? Dr Hancock   Date of surgery / procedure: 10/21/2024   Time of surgery / procedure: 10am   Where do you plan to recover after surgery? at home with family        Fax number for surgical facility: [phone]    {Provider Charting Preference for Preop :093135}    Solange Ferreira is a 84 year old, presenting for the following:  Pre-Op Exam (Patient states she would like a new application for handicap sticker. )          10/7/2024    10:14 AM   Additional Questions   Roomed by Lennox RIDLEY MA   Accompanied by Daughter         10/7/2024   Forms   Any forms needing to be completed Yes      HPI related to upcoming procedure: ***        10/2/2024   Pre-Op Questionnaire   Have you ever had a heart attack or stroke? No   Have you ever had surgery on your heart or blood vessels, such as a stent placement, a coronary artery bypass, or surgery on an artery in your head, neck, heart, or legs? No   Do you have chest pain with activity? No   Do you have a history of heart failure? No   Do you currently have a cold, bronchitis or symptoms of other infection? No   Do you have a cough, shortness of breath, or wheezing? No   Do you or anyone in your family have previous history of blood clots? No   Do you or does anyone in your family have a serious bleeding  problem such as prolonged bleeding following surgeries or cuts? No   Have you ever had problems with anemia or been told to take iron pills? No   Have you had any abnormal blood loss such as black, tarry or bloody stools, or abnormal vaginal bleeding? No   Have you ever had a blood transfusion? No   Are you willing to have a blood transfusion if it is medically needed before, during, or after your surgery? Yes   Have you or any of your relatives ever had problems with anesthesia? No   Do you have sleep apnea, excessive snoring or daytime drowsiness? No   Do you have any artifical heart valves or other implanted medical devices like a pacemaker, defibrillator, or continuous glucose monitor? No   Do you have artificial joints? No   Are you allergic to latex? No        Health Care Directive  Patient has a Health Care Directive on file      Preoperative Review of   {Mnpmpreport:455199}  {Review MNPMP for all patients [facility]:526476}    {Chronic problem details (Optional) :931776}    Patient Active Problem List    Diagnosis Date Noted    Morbid obesity (H) 05/09/2024     Priority: Medium    Arthropathy of lumbosacral facet joint 01/25/2024     Priority: Medium    Facet arthropathy, lumbar 01/25/2024     Priority: Medium    Gait instability 01/25/2024     Priority: Medium    Left leg weakness 01/25/2024     Priority: Medium    Lumbosacral disc herniation 01/25/2024     Priority: Medium    Lumbosacral radiculopathy 01/25/2024     Priority: Medium    Physical deconditioning 01/25/2024     Priority: Medium    Stenosis of lateral recess of lumbar spine 01/25/2024     Priority: Medium    Stenosis of lateral recess of lumbosacral spine 01/25/2024     Priority: Medium    Class 1 obesity due to excess calories with serious comorbidity and body mass index (BMI) of 34.0 to 34.9 in adult 07/08/2019     Priority: Medium    Prediabetes 07/08/2019     Priority: Medium    Essential Hypertension      Priority: Medium      Created by Conversion  Replacement Utility updated for latest IMO load        Hyperglycemia      Priority: Medium     Created by Conversion        Lumbago      Priority: Medium     Created by Conversion        Lumbar disc herniation 04/19/2013     Priority: Medium      Past Medical History:   Diagnosis Date    Arthritis Gradual    Hypertension      Past Surgical History:   Procedure Laterality Date    BACK SURGERY      CHOLECYSTECTOMY      COLONOSCOPY      EYE SURGERY      HEAD & NECK SURGERY      HYSTERECTOMY  01/01/1978    [facility] APPENDECTOMY      Description: Appendectomy;  Recorded: 02/27/2008;    [facility] TOTAL ABDOM HYSTERECTOMY      Description: Hysterectomy;  Recorded: 02/27/2008;    [facility] TOTAL ABDOM HYSTERECTOMY      Description: Hysterectomy;  Recorded: 02/27/2008;     Current Outpatient Medications   Medication Sig Dispense Refill    amLODIPine (NORVASC) 10 MG tablet Take 1 tablet (10 mg) by mouth daily 90 tablet 3    EPINEPHrine (ANY BX GENERIC EQUIV) 0.3 MG/0.3ML injection 2-pack INJECT 1 PEN INTRAMUSCULARLY AS DIRECTED AS NEEDED FOR ANAPHYLAXIS. MAY REPEAT DOSE ONCE AFTER 5 TO 15 MINUTES IF NEEDED. 2 each 2    hydrochlorothiazide (HYDRODIURIL) 25 MG tablet Take 1 tablet (25 mg) by mouth daily 90 tablet 3    losartan (COZAAR) 50 MG tablet Take 1 tablet (50 mg) by mouth daily 90 tablet 3    metoprolol succinate ER (TOPROL XL) 25 MG 24 hr tablet TAKE 2 TABLETS(50 MG) BY MOUTH DAILY 180 tablet 3    Multiple Vitamins-Minerals (PRESERVISION AREDS 2) CAPS          Allergies   Allergen Reactions    Bee Venom Anaphylaxis    Hornet Venom Anaphylaxis        Social History     Tobacco Use    Smoking status: Former    Smokeless tobacco: Never   Substance Use Topics    Alcohol use: Yes     Alcohol/week: 1.0 standard drink of alcohol     Comment: Occasionally     {FAMILY HISTORY (Optional):591177365}  History   Drug Use No           {ROS Picklists (Optional):894090}    Objective    BP (!) 144/78 (BP Location: Left arm,  "Patient Position: Sitting, Cuff Size: Adult Regular)   Pulse 73   Temp 98.1  F (36.7  C) (Oral)   Resp 16   Ht 1.575 m (5' 2\")   Wt 85.3 kg (188 lb)   LMP  (LMP Unknown)   SpO2 96%   BMI 34.39 kg/m     Estimated body mass index is 34.39 kg/m  as calculated from the following:    Height as of this encounter: 1.575 m (5' 2\").    Weight as of this encounter: 85.3 kg (188 lb).  Physical Exam  {Exam List :320236}    Recent Labs   Lab Test 24  0957 10/31/23  1602   HGB 15.5 14.7   PLT  --  265     --    POTASSIUM 4.7  --    CR 0.89  --    A1C 5.9*  --         Diagnostics  {LABS:850780}   {EK}    Revised Cardiac Risk Index (RCRI)  The patient has the following serious cardiovascular risks for perioperative complications:  {PREOP REVISED CARDIAC RISK INDEX (RCRI) :876418}     RCRI Interpretation: {REVISED CARDIAC RISK INTERPRETATION :828708}         Signed Electronically by: Delmer Back MD  A copy of this evaluation report is provided to the requesting physician.    {Provider Resources  Preop [facility] Preop Guidelines  Revised Cardiac Risk Index :914812}   {Email feedback regarding this note to [email or web address]   :968922}  "

## 2024-10-07 NOTE — LETTER
October 8, 2024      Hanna Barron  [address] [place] [address]        To who it may concern,     We are writing to inform you of Tia Barron's test results. Labs are all very good.         Resulted Orders   Hemoglobin A1c   Result Value Ref Range    Estimated Average Glucose 126 (H) <117 mg/dL    Hemoglobin A1C 6.0 (H) 0.0 - 5.6 %      Comment:      Normal <5.7%   Prediabetes 5.7-6.4%    Diabetes 6.5% or higher     Note: Adopted from ADA consensus guidelines.   Glucose   Result Value Ref Range    Glucose 120 (H) 70 - 99 mg/dL    Patient Fasting > 8hrs? Unknown    Hemoglobin   Result Value Ref Range    Hemoglobin 14.9 11.7 - 15.7 g/dL   Potassium   Result Value Ref Range    Potassium 4.7 3.4 - 5.3 mmol/L       If you have any questions or concerns, please call the clinic at the number listed above.       Sincerely,      Dr. Wong MD

## 2024-12-17 DIAGNOSIS — I10 ESSENTIAL HYPERTENSION: ICD-10-CM

## 2024-12-17 RX ORDER — METOPROLOL SUCCINATE 25 MG/1
TABLET, EXTENDED RELEASE ORAL
Qty: 180 TABLET | Refills: 11 | Status: SHIPPED | OUTPATIENT
Start: 2024-12-17

## 2024-12-17 RX ORDER — HYDROCHLOROTHIAZIDE 25 MG/1
25 TABLET ORAL DAILY
Qty: 90 TABLET | Refills: 11 | Status: SHIPPED | OUTPATIENT
Start: 2024-12-17

## 2025-01-20 ENCOUNTER — OFFICE VISIT (OUTPATIENT)
Dept: INTERNAL MEDICINE | Facility: CLINIC | Age: 85
End: 2025-01-20
Payer: MEDICARE

## 2025-01-20 VITALS
SYSTOLIC BLOOD PRESSURE: 126 MMHG | DIASTOLIC BLOOD PRESSURE: 78 MMHG | TEMPERATURE: 97.8 F | WEIGHT: 188 LBS | HEART RATE: 75 BPM | BODY MASS INDEX: 34.6 KG/M2 | HEIGHT: 62 IN | RESPIRATION RATE: 16 BRPM | OXYGEN SATURATION: 97 %

## 2025-01-20 DIAGNOSIS — R60.9 DEPENDENT EDEMA: ICD-10-CM

## 2025-01-20 DIAGNOSIS — E11.8 TYPE 2 DIABETES MELLITUS WITH COMPLICATION, WITHOUT LONG-TERM CURRENT USE OF INSULIN (H): ICD-10-CM

## 2025-01-20 DIAGNOSIS — I10 ESSENTIAL HYPERTENSION: Primary | ICD-10-CM

## 2025-01-20 DIAGNOSIS — E11.69 TYPE 2 DIABETES MELLITUS WITH OTHER SPECIFIED COMPLICATION, WITHOUT LONG-TERM CURRENT USE OF INSULIN (H): ICD-10-CM

## 2025-01-20 PROBLEM — E66.01 MORBID OBESITY (H): Status: RESOLVED | Noted: 2024-05-09 | Resolved: 2025-01-20

## 2025-01-20 LAB — POTASSIUM SERPL-SCNC: 4.9 MMOL/L (ref 3.4–5.3)

## 2025-01-20 PROCEDURE — G2211 COMPLEX E/M VISIT ADD ON: HCPCS | Performed by: INTERNAL MEDICINE

## 2025-01-20 PROCEDURE — 84132 ASSAY OF SERUM POTASSIUM: CPT | Performed by: INTERNAL MEDICINE

## 2025-01-20 PROCEDURE — 36415 COLL VENOUS BLD VENIPUNCTURE: CPT | Performed by: INTERNAL MEDICINE

## 2025-01-20 PROCEDURE — 99214 OFFICE O/P EST MOD 30 MIN: CPT | Performed by: INTERNAL MEDICINE

## 2025-01-20 NOTE — PROGRESS NOTES
Assessment/Plan:    (I10) Essential hypertension  (primary encounter diagnosis)  Comment: Good blood pressure control today at 126/78.  Plan: Recheck potassium level today on HCTZ 25 mg daily.  Has a high potassium diet.    (E11.8) Type 2 diabetes mellitus with complication, without long-term current use of insulin (H)  Comment: With next office visit fasting in 4 months time we will check A1c blood sugar lipid panel.  Plan: Stable.  Diet controlled.    (R60.9) Dependent edema  Comment: Less leg edema if legs are elevated and avoid salt in diet support stockings on in the morning off at night by David over-the-counter.  At any [facility] pharmacy.  Plan: Restriction leg elevation support stockings on in the morning off at night advised.  HCTZ on board 25 mg daily may help.  Amlodipine high-dose 10 mg daily contributing to edema.    (E11.69) Type 2 diabetes mellitus with other specified complication, without long-term current use of insulin (H)  Comment: See above.  With next office visit will do fasting labs.  Plan: Fasting labs for type 2 diabetes will review and do in 4 months time.  Daughter present she understands.  These will include A1c blood sugar lipid panel urine for microalbumin.  Obesity BMI 34+ contributing to diabetes mellitus type 2 occurrence through a insulin resistance mechanism.  Discussed in detail previously.        25 minutes spent on the date of the encounter doing chart review, review of test results, interpretation of tests, patient visit, and documentation     Subjective:  Tia Barron is a 84 year old female presents for the following health issues easily conversant good spirited accompanied by her daughter.  Generally feels well.  Pointed to her lower extremities because of edema.  Discussed in detail see above.    ROS:  No blood in stool or urine no chest pain shortness of breath med list reviewed reconciled in the chart.    Objective:  /78   Pulse 75   Temp 97.8  F (36.6  C) (Oral)   " Resp 16   Ht 1.575 m (5' 2\")   Wt 85.3 kg (188 lb)   LMP  (LMP Unknown)   SpO2 97%   BMI 34.39 kg/m    Neck veins nondistended no carotid bruits thyroid not enlarged chest clear heart tones normal abdomen obese BMI elevated discussed previously 34 today.  Extremities are slightly edematous chronic in appearance not pitting no cyanosis or clubbing noted.  There is no central cyanosis she appeared well neatly attired.    Delmer Back MD  Internal Medicine    The longitudinal plan of care for the diagnosis(es)/condition(s) as documented were addressed during this visit. Due to the added complexity in care, I will continue to support Hanna in the subsequent management and with ongoing continuity of care.      Answers submitted by the patient for this visit:  Hypertension Visit (Submitted on 1/9/2025)  Chief Complaint: Chronic problems general questions HPI Form  Do you check your blood pressure regularly outside of the clinic?: Yes  Are your blood pressures ever more than 140 on the top number (systolic) OR more than 90 on the bottom number (diastolic)? (For example, greater than 140/90): Yes  Are you following a low salt diet?: Yes  General Questionnaire (Submitted on 1/9/2025)  Chief Complaint: Chronic problems general questions HPI Form  How many servings of fruits and vegetables do you eat daily?: 2-3  On average, how many sweetened beverages do you drink each day (Examples: soda, juice, sweet tea, etc.  Do NOT count diet or artificially sweetened beverages)?: 1  How many minutes a day do you exercise enough to make your heart beat faster?: 20 to 29  How many days a week do you exercise enough to make your heart beat faster?: 4  How many days per week do you miss taking your medication?: 0  Questionnaire about: Chronic problems general questions HPI Form (Submitted on 1/9/2025)  Chief Complaint: Chronic problems general questions HPI Form    "

## 2025-01-24 DIAGNOSIS — I10 ESSENTIAL HYPERTENSION: ICD-10-CM

## 2025-01-27 RX ORDER — AMLODIPINE BESYLATE 10 MG/1
10 TABLET ORAL DAILY
Qty: 90 TABLET | Refills: 3 | Status: SHIPPED | OUTPATIENT
Start: 2025-01-27

## 2025-01-27 RX ORDER — LOSARTAN POTASSIUM 50 MG/1
50 TABLET ORAL DAILY
Qty: 90 TABLET | Refills: 3 | Status: SHIPPED | OUTPATIENT
Start: 2025-01-27

## 2025-03-18 ENCOUNTER — TELEPHONE (OUTPATIENT)
Dept: INTERNAL MEDICINE | Facility: CLINIC | Age: 85
End: 2025-03-18
Payer: MEDICARE

## 2025-03-18 NOTE — TELEPHONE ENCOUNTER
Received form from [facility]. Form place in PCP inbox to sign.    Rashawn Thurman MA on 3/18/2025 at 1:28 PM

## 2025-03-20 ENCOUNTER — MEDICAL CORRESPONDENCE (OUTPATIENT)
Dept: HEALTH INFORMATION MANAGEMENT | Facility: CLINIC | Age: 85
End: 2025-03-20
Payer: MEDICARE

## 2025-03-20 NOTE — TELEPHONE ENCOUNTER
Form signed and fax. Copies made for monthly hold bin and place in scan.    Rashawn Thurman MA on 3/20/2025 at 12:40 PM

## 2025-04-26 ENCOUNTER — HEALTH MAINTENANCE LETTER (OUTPATIENT)
Age: 85
End: 2025-04-26

## 2025-05-13 ENCOUNTER — OFFICE VISIT (OUTPATIENT)
Dept: INTERNAL MEDICINE | Facility: CLINIC | Age: 85
End: 2025-05-13
Payer: MEDICARE

## 2025-05-13 VITALS
HEIGHT: 62 IN | BODY MASS INDEX: 35.7 KG/M2 | RESPIRATION RATE: 14 BRPM | WEIGHT: 194 LBS | DIASTOLIC BLOOD PRESSURE: 82 MMHG | OXYGEN SATURATION: 100 % | SYSTOLIC BLOOD PRESSURE: 138 MMHG | HEART RATE: 78 BPM | TEMPERATURE: 98.1 F

## 2025-05-13 DIAGNOSIS — I10 ESSENTIAL HYPERTENSION: Primary | ICD-10-CM

## 2025-05-13 DIAGNOSIS — K58.0 IRRITABLE BOWEL SYNDROME WITH DIARRHEA: ICD-10-CM

## 2025-05-13 PROCEDURE — 99214 OFFICE O/P EST MOD 30 MIN: CPT | Performed by: INTERNAL MEDICINE

## 2025-05-13 PROCEDURE — G2211 COMPLEX E/M VISIT ADD ON: HCPCS | Performed by: INTERNAL MEDICINE

## 2025-05-13 PROCEDURE — 3079F DIAST BP 80-89 MM HG: CPT | Performed by: INTERNAL MEDICINE

## 2025-05-13 PROCEDURE — 3075F SYST BP GE 130 - 139MM HG: CPT | Performed by: INTERNAL MEDICINE

## 2025-05-13 NOTE — PROGRESS NOTES
"Assessment/Plan:    (I10) Essential hypertension  (primary encounter diagnosis)  Comment: Controlled stable 138/82 today.  Plan: Continue same meds and cares suggest weight loss with diet restriction regular exercise.    (K58.0) Irritable bowel syndrome with diarrhea  Comment: Loose stools continue.  Despite treatment previously with Imodium and Metamucil.  May be a candidate for Bentyl.  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        25 minutes spent on the date of the encounter doing chart review, patient visit, documentation, and discussion with family     Subjective:  Tia Barron is a 85 year old female presents for the following health issues companied by her daughter has had rectal urgency and loose stools on occasion.  Not better with Imodium and not better with Metamucil.    ROS:  Associated weight loss no blood in stool or urine denies chest pain shortness of breath.  Medication list reviewed reconciled in the chart generally well-tolerated.    Objective:  /82   Pulse 78   Temp 98.1  F (36.7  C) (Oral)   Resp 14   Ht 1.575 m (5' 2\")   Wt 88 kg (194 lb)   LMP  (LMP Unknown)   SpO2 100%   BMI 35.48 kg/m    Neck veins not distended no thyromegaly or thyroid nodules no carotid bruits left or right chest clear heart tones normal abdomen obese BMI elevated at 35+ extremities free of edema cyanosis or clubbing short cropped hair she appears well daughter is at her side Georgina and is very supportive.  Lives in assisted living and is quite self functional for her age.  85.    Delmer Back MD  Internal Medicine    The longitudinal plan of care for the diagnosis(es)/condition(s) as documented were addressed during this visit. Due to the added complexity in care, I will continue to support Hanna in the " subsequent management and with ongoing continuity of care.      Answers submitted by the patient for this visit:  Lipid Visit (Submitted on 5/8/2025)  Chief Complaint: Chronic problems general questions HPI Form  Are you regularly taking any medication or supplement to lower your cholesterol?: Yes  Are you having muscle aches or other side effects that you think could be caused by your cholesterol lowering medication?: No  Hypertension Visit (Submitted on 5/8/2025)  Chief Complaint: Chronic problems general questions HPI Form  Do you check your blood pressure regularly outside of the clinic?: No  Are your blood pressures ever more than 140 on the top number (systolic) OR more than 90 on the bottom number (diastolic)? (For example, greater than 140/90): No  Are you following a low salt diet?: Yes  General Questionnaire (Submitted on 5/8/2025)  Chief Complaint: Chronic problems general questions HPI Form  How many servings of fruits and vegetables do you eat daily?: 2-3  On average, how many sweetened beverages do you drink each day (Examples: soda, juice, sweet tea, etc.  Do NOT count diet or artificially sweetened beverages)?: 1  How many minutes a day do you exercise enough to make your heart beat faster?: 20 to 29  How many days a week do you exercise enough to make your heart beat faster?: 3 or less  How many days per week do you miss taking your medication?: 0  Questionnaire about: Chronic problems general questions HPI Form (Submitted on 5/8/2025)  Chief Complaint: Chronic problems general questions HPI Form

## 2025-05-16 ENCOUNTER — TRANSFERRED RECORDS (OUTPATIENT)
Dept: HEALTH INFORMATION MANAGEMENT | Facility: CLINIC | Age: 85
End: 2025-05-16
Payer: MEDICARE

## 2025-05-22 ENCOUNTER — TRANSFERRED RECORDS (OUTPATIENT)
Dept: HEALTH INFORMATION MANAGEMENT | Facility: CLINIC | Age: 85
End: 2025-05-22
Payer: MEDICARE